# Patient Record
Sex: MALE | HISPANIC OR LATINO | Employment: FULL TIME | URBAN - METROPOLITAN AREA
[De-identification: names, ages, dates, MRNs, and addresses within clinical notes are randomized per-mention and may not be internally consistent; named-entity substitution may affect disease eponyms.]

---

## 2019-03-13 ENCOUNTER — HOSPITAL ENCOUNTER (OUTPATIENT)
Dept: LAB | Facility: MEDICAL CENTER | Age: 24
End: 2019-03-13
Attending: PSYCHIATRY & NEUROLOGY
Payer: COMMERCIAL

## 2019-03-13 ENCOUNTER — OFFICE VISIT (OUTPATIENT)
Dept: NEUROLOGY | Facility: MEDICAL CENTER | Age: 24
End: 2019-03-13
Payer: COMMERCIAL

## 2019-03-13 VITALS
DIASTOLIC BLOOD PRESSURE: 60 MMHG | RESPIRATION RATE: 16 BRPM | TEMPERATURE: 97.1 F | SYSTOLIC BLOOD PRESSURE: 100 MMHG | OXYGEN SATURATION: 94 % | BODY MASS INDEX: 28.09 KG/M2 | WEIGHT: 179 LBS | HEART RATE: 56 BPM | HEIGHT: 67 IN

## 2019-03-13 DIAGNOSIS — F07.81 POST CONCUSSION SYNDROME: ICD-10-CM

## 2019-03-13 DIAGNOSIS — R41.3 MEMORY LOSS: ICD-10-CM

## 2019-03-13 DIAGNOSIS — F98.8 ATTENTION DEFICIT DISORDER, UNSPECIFIED HYPERACTIVITY PRESENCE: ICD-10-CM

## 2019-03-13 DIAGNOSIS — E53.8 B12 DEFICIENCY: ICD-10-CM

## 2019-03-13 LAB
ALBUMIN SERPL BCP-MCNC: 5.3 G/DL (ref 3.2–4.9)
ALBUMIN/GLOB SERPL: 1.7 G/DL
ALP SERPL-CCNC: 65 U/L (ref 30–99)
ALT SERPL-CCNC: 15 U/L (ref 2–50)
ANION GAP SERPL CALC-SCNC: 10 MMOL/L (ref 0–11.9)
AST SERPL-CCNC: 19 U/L (ref 12–45)
BASOPHILS # BLD AUTO: 0.9 % (ref 0–1.8)
BASOPHILS # BLD: 0.05 K/UL (ref 0–0.12)
BILIRUB SERPL-MCNC: 1.2 MG/DL (ref 0.1–1.5)
BUN SERPL-MCNC: 14 MG/DL (ref 8–22)
CALCIUM SERPL-MCNC: 10.5 MG/DL (ref 8.5–10.5)
CHLORIDE SERPL-SCNC: 101 MMOL/L (ref 96–112)
CO2 SERPL-SCNC: 31 MMOL/L (ref 20–33)
CREAT SERPL-MCNC: 1.2 MG/DL (ref 0.5–1.4)
EOSINOPHIL # BLD AUTO: 0.04 K/UL (ref 0–0.51)
EOSINOPHIL NFR BLD: 0.7 % (ref 0–6.9)
ERYTHROCYTE [DISTWIDTH] IN BLOOD BY AUTOMATED COUNT: 39.1 FL (ref 35.9–50)
GLOBULIN SER CALC-MCNC: 3.1 G/DL (ref 1.9–3.5)
GLUCOSE SERPL-MCNC: 86 MG/DL (ref 65–99)
HCT VFR BLD AUTO: 50.8 % (ref 42–52)
HGB BLD-MCNC: 16 G/DL (ref 14–18)
IMM GRANULOCYTES # BLD AUTO: 0.01 K/UL (ref 0–0.11)
IMM GRANULOCYTES NFR BLD AUTO: 0.2 % (ref 0–0.9)
LYMPHOCYTES # BLD AUTO: 1.49 K/UL (ref 1–4.8)
LYMPHOCYTES NFR BLD: 27.8 % (ref 22–41)
MCH RBC QN AUTO: 26.5 PG (ref 27–33)
MCHC RBC AUTO-ENTMCNC: 31.5 G/DL (ref 33.7–35.3)
MCV RBC AUTO: 84.1 FL (ref 81.4–97.8)
MONOCYTES # BLD AUTO: 0.68 K/UL (ref 0–0.85)
MONOCYTES NFR BLD AUTO: 12.7 % (ref 0–13.4)
NEUTROPHILS # BLD AUTO: 3.09 K/UL (ref 1.82–7.42)
NEUTROPHILS NFR BLD: 57.7 % (ref 44–72)
NRBC # BLD AUTO: 0 K/UL
NRBC BLD-RTO: 0 /100 WBC
PLATELET # BLD AUTO: 274 K/UL (ref 164–446)
PMV BLD AUTO: 9.9 FL (ref 9–12.9)
POTASSIUM SERPL-SCNC: 3.8 MMOL/L (ref 3.6–5.5)
PROT SERPL-MCNC: 8.4 G/DL (ref 6–8.2)
RBC # BLD AUTO: 6.04 M/UL (ref 4.7–6.1)
SODIUM SERPL-SCNC: 142 MMOL/L (ref 135–145)
TSH SERPL DL<=0.005 MIU/L-ACNC: 1.34 UIU/ML (ref 0.38–5.33)
VIT B12 SERPL-MCNC: 260 PG/ML (ref 211–911)
WBC # BLD AUTO: 5.4 K/UL (ref 4.8–10.8)

## 2019-03-13 PROCEDURE — 99204 OFFICE O/P NEW MOD 45 MIN: CPT | Performed by: PSYCHIATRY & NEUROLOGY

## 2019-03-13 PROCEDURE — 82607 VITAMIN B-12: CPT

## 2019-03-13 PROCEDURE — 85025 COMPLETE CBC W/AUTO DIFF WBC: CPT

## 2019-03-13 PROCEDURE — 84443 ASSAY THYROID STIM HORMONE: CPT

## 2019-03-13 PROCEDURE — 36415 COLL VENOUS BLD VENIPUNCTURE: CPT

## 2019-03-13 PROCEDURE — 80053 COMPREHEN METABOLIC PANEL: CPT

## 2019-03-13 RX ORDER — METOCLOPRAMIDE 10 MG/1
TABLET ORAL
Refills: 0 | COMMUNITY
Start: 2019-01-14 | End: 2019-03-13

## 2019-03-13 RX ORDER — IBUPROFEN 400 MG/1
TABLET ORAL
Refills: 0 | COMMUNITY
Start: 2019-01-13 | End: 2019-03-13

## 2019-03-13 RX ORDER — AMOXICILLIN AND CLAVULANATE POTASSIUM 875; 125 MG/1; MG/1
TABLET, FILM COATED ORAL
Refills: 0 | COMMUNITY
Start: 2019-01-08 | End: 2019-03-13

## 2019-03-13 RX ORDER — ONDANSETRON 4 MG/1
TABLET, ORALLY DISINTEGRATING ORAL
Refills: 0 | COMMUNITY
Start: 2019-01-13 | End: 2019-03-13

## 2019-03-13 RX ORDER — LISDEXAMFETAMINE DIMESYLATE 30 MG/1
CAPSULE ORAL
COMMUNITY
Start: 2019-03-07 | End: 2020-03-04 | Stop reason: SDUPTHER

## 2019-03-13 RX ORDER — NAPROXEN 500 MG/1
500 TABLET ORAL
COMMUNITY
Start: 2018-07-24 | End: 2019-03-13

## 2019-03-13 RX ORDER — DIPHENHYDRAMINE HCL 25 MG
25 TABLET ORAL EVERY 6 HOURS PRN
COMMUNITY

## 2019-03-13 ASSESSMENT — ENCOUNTER SYMPTOMS
ABDOMINAL PAIN: 0
FALLS: 0
HALLUCINATIONS: 0
BRUISES/BLEEDS EASILY: 0
WEIGHT LOSS: 0
EYE DISCHARGE: 0
SORE THROAT: 0
SHORTNESS OF BREATH: 0
FEVER: 0

## 2019-03-13 ASSESSMENT — PATIENT HEALTH QUESTIONNAIRE - PHQ9: CLINICAL INTERPRETATION OF PHQ2 SCORE: 0
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
## 2019-03-13 NOTE — PROGRESS NOTES
Borderline low B12 in the setting of memory complaints.  Will continue with further workup to evaluate for possible B12 or other deficiencies with a folate, MMA, homocystine.  We will have Angy alert patient.

## 2019-03-14 ENCOUNTER — TELEPHONE (OUTPATIENT)
Dept: NEUROLOGY | Facility: MEDICAL CENTER | Age: 24
End: 2019-03-14

## 2019-03-14 NOTE — TELEPHONE ENCOUNTER
----- Message from Rose Vaughan M.D. sent at 3/13/2019  2:54 PM PDT -----  Regarding: Additional lab  He Angy I reviewed his labs and his B12 level is borderline low.  I have ordered additional labs to further evaluate in case this is the cause of his memory problems.  Please alert the patient.  Thank you

## 2019-03-14 NOTE — TELEPHONE ENCOUNTER
Spoke to patient - relayed message and he will complete additional blood work.    Pt also stated he plans on donating a kidney in the future. He would like to know his blood type. Advised pt this does not come from our office. Pt wanted me to ask Dr. Vaughan because he is very curious on his blood type...

## 2019-03-15 ENCOUNTER — HOSPITAL ENCOUNTER (OUTPATIENT)
Dept: LAB | Facility: MEDICAL CENTER | Age: 24
End: 2019-03-15
Attending: PSYCHIATRY & NEUROLOGY
Payer: COMMERCIAL

## 2019-03-15 DIAGNOSIS — E53.8 B12 DEFICIENCY: ICD-10-CM

## 2019-03-15 LAB
FOLATE SERPL-MCNC: 14.5 NG/ML
HCYS SERPL-SCNC: 7.49 UMOL/L

## 2019-03-15 PROCEDURE — 82746 ASSAY OF FOLIC ACID SERUM: CPT

## 2019-03-15 PROCEDURE — 36415 COLL VENOUS BLD VENIPUNCTURE: CPT

## 2019-03-15 PROCEDURE — 83921 ORGANIC ACID SINGLE QUANT: CPT

## 2019-03-15 PROCEDURE — 83090 ASSAY OF HOMOCYSTEINE: CPT

## 2019-03-18 LAB — METHYLMALONATE SERPL-SCNC: 0.12 UMOL/L (ref 0–0.4)

## 2019-03-25 ENCOUNTER — HOSPITAL ENCOUNTER (OUTPATIENT)
Dept: RADIOLOGY | Facility: MEDICAL CENTER | Age: 24
End: 2019-03-25
Attending: PSYCHIATRY & NEUROLOGY
Payer: COMMERCIAL

## 2019-03-25 DIAGNOSIS — F07.81 POST CONCUSSION SYNDROME: ICD-10-CM

## 2019-03-25 DIAGNOSIS — R41.3 MEMORY LOSS: ICD-10-CM

## 2019-03-25 PROCEDURE — 70551 MRI BRAIN STEM W/O DYE: CPT

## 2019-03-29 ENCOUNTER — TELEPHONE (OUTPATIENT)
Dept: NEUROLOGY | Facility: MEDICAL CENTER | Age: 24
End: 2019-03-29

## 2019-03-29 NOTE — TELEPHONE ENCOUNTER
Rose Vaughan M.D.  Angy Larson, Med Ass't   Caller: Unspecified (Today, 11:34 AM)             Please let him know that the follow-up testing for B12 deficiency was unremarkable.  He does not have B12 deficiency.  He is welcome to take a multivitamin.     MRI brain was otherwise unremarkable.  Continue follow-up as planned. Thx      Called pt and informed.

## 2019-06-13 ENCOUNTER — OFFICE VISIT (OUTPATIENT)
Dept: NEUROLOGY | Facility: MEDICAL CENTER | Age: 24
End: 2019-06-13
Payer: COMMERCIAL

## 2019-06-13 VITALS
SYSTOLIC BLOOD PRESSURE: 104 MMHG | WEIGHT: 170 LBS | TEMPERATURE: 97 F | DIASTOLIC BLOOD PRESSURE: 70 MMHG | BODY MASS INDEX: 26.68 KG/M2 | OXYGEN SATURATION: 95 % | HEIGHT: 67 IN | HEART RATE: 74 BPM | RESPIRATION RATE: 16 BRPM

## 2019-06-13 DIAGNOSIS — F98.8 ATTENTION DEFICIT DISORDER, UNSPECIFIED HYPERACTIVITY PRESENCE: ICD-10-CM

## 2019-06-13 DIAGNOSIS — F07.81 POST CONCUSSION SYNDROME: ICD-10-CM

## 2019-06-13 DIAGNOSIS — R41.3 MEMORY LOSS: ICD-10-CM

## 2019-06-13 PROCEDURE — 99214 OFFICE O/P EST MOD 30 MIN: CPT | Performed by: PSYCHIATRY & NEUROLOGY

## 2019-06-13 ASSESSMENT — ENCOUNTER SYMPTOMS
SHORTNESS OF BREATH: 0
WEIGHT LOSS: 0
SORE THROAT: 0
DEPRESSION: 0
FEVER: 0
FALLS: 0
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
## 2020-03-04 ENCOUNTER — OFFICE VISIT (OUTPATIENT)
Dept: MEDICAL GROUP | Facility: MEDICAL CENTER | Age: 25
End: 2020-03-04
Payer: COMMERCIAL

## 2020-03-04 VITALS
OXYGEN SATURATION: 99 % | HEART RATE: 68 BPM | DIASTOLIC BLOOD PRESSURE: 68 MMHG | TEMPERATURE: 97.5 F | HEIGHT: 67 IN | SYSTOLIC BLOOD PRESSURE: 108 MMHG | BODY MASS INDEX: 25.24 KG/M2 | WEIGHT: 160.8 LBS

## 2020-03-04 DIAGNOSIS — L70.0 ACNE VULGARIS: ICD-10-CM

## 2020-03-04 DIAGNOSIS — F98.8 ATTENTION DEFICIT DISORDER (ADD) WITHOUT HYPERACTIVITY: ICD-10-CM

## 2020-03-04 DIAGNOSIS — Z79.899 ON STIMULANT MEDICATION: ICD-10-CM

## 2020-03-04 DIAGNOSIS — J45.20 MILD INTERMITTENT ASTHMA WITHOUT COMPLICATION: ICD-10-CM

## 2020-03-04 PROBLEM — D16.12 ENCHONDROMA OF FINGER OF LEFT HAND: Status: ACTIVE | Noted: 2018-07-25

## 2020-03-04 PROBLEM — Q87.89: Status: ACTIVE | Noted: 2020-03-04

## 2020-03-04 PROCEDURE — 99204 OFFICE O/P NEW MOD 45 MIN: CPT | Performed by: NURSE PRACTITIONER

## 2020-03-04 RX ORDER — ALBUTEROL SULFATE 90 UG/1
2 AEROSOL, METERED RESPIRATORY (INHALATION) EVERY 4 HOURS PRN
Qty: 1 INHALER | Refills: 3 | Status: SHIPPED | OUTPATIENT
Start: 2020-03-04 | End: 2021-11-17 | Stop reason: SDUPTHER

## 2020-03-04 RX ORDER — LISDEXAMFETAMINE DIMESYLATE 30 MG/1
30 CAPSULE ORAL EVERY MORNING
Qty: 30 CAP | Refills: 0 | Status: SHIPPED | OUTPATIENT
Start: 2020-03-04 | End: 2020-04-03 | Stop reason: SDUPTHER

## 2020-03-04 RX ORDER — TRETINOIN 1 MG/G
CREAM TOPICAL NIGHTLY
COMMUNITY
End: 2020-03-04 | Stop reason: SDUPTHER

## 2020-03-04 RX ORDER — TRETINOIN 1 MG/G
1 CREAM TOPICAL EVERY EVENING
Qty: 45 G | Refills: 1 | Status: SHIPPED | OUTPATIENT
Start: 2020-03-04 | End: 2020-09-02

## 2020-03-04 SDOH — HEALTH STABILITY: MENTAL HEALTH: HOW MANY STANDARD DRINKS CONTAINING ALCOHOL DO YOU HAVE ON A TYPICAL DAY?: 3 OR 4

## 2020-03-04 SDOH — HEALTH STABILITY: MENTAL HEALTH: HOW OFTEN DO YOU HAVE A DRINK CONTAINING ALCOHOL?: 2-4 TIMES A MONTH

## 2020-03-04 SDOH — HEALTH STABILITY: MENTAL HEALTH: HOW OFTEN DO YOU HAVE 6 OR MORE DRINKS ON ONE OCCASION?: LESS THAN MONTHLY

## 2020-03-04 ASSESSMENT — PATIENT HEALTH QUESTIONNAIRE - PHQ9
CLINICAL INTERPRETATION OF PHQ2 SCORE: 1
5. POOR APPETITE OR OVEREATING: 0 - NOT AT ALL
SUM OF ALL RESPONSES TO PHQ QUESTIONS 1-9: 1

## 2020-03-04 ASSESSMENT — FIBROSIS 4 INDEX: FIB4 SCORE: 0.41
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
## 2020-03-04 NOTE — LETTER
[facility]  Pcp Pt States None  No address on file  Fax: [phone]   Authorization for Release/Disclosure of   Protected Health Information   Name: KULWANT MCKEON : 1995 SSN: [id number]   Address: [facility] [address]  [place] [address] Phone:    [phone] (home)    I authorize the entity listed below to release/disclose the PHI below to:   [facility]/Pcp Pt States None and LOTTIE [place]   Provider or Entity Name:     Address   City, State, Zip   Phone:      Fax:     Reason for request: continuity of care   Information to be released:    [  ] LAST COLONOSCOPY,  including any PATH REPORT and follow-up  [  ] LAST FIT/COLOGUARD RESULT [  ] LAST DEXA  [  ] LAST MAMMOGRAM  [  ] LAST PAP  [  ] LAST LABS [  ] RETINA EXAM REPORT  [  ] IMMUNIZATION RECORDS  [  ] Release all info      [  ] Check here and initial the line next to each item to release ALL health information INCLUDING  _____ Care and treatment for drug and / or alcohol abuse  _____ HIV testing, infection status, or AIDS  _____ Genetic Testing    DATES OF SERVICE OR TIME PERIOD TO BE DISCLOSED: _____________  I understand and acknowledge that:  * This Authorization may be revoked at any time by you in writing, except if your health information has already been used or disclosed.  * Your health information that will be used or disclosed as a result of you signing this authorization could be re-disclosed by the recipient. If this occurs, your re-disclosed health information may no longer be protected by State or Federal laws.  * You may refuse to sign this Authorization. Your refusal will not affect your ability to obtain treatment.  * This Authorization becomes effective upon signing and will  on (date) __________.      If no date is indicated, this Authorization will  one (1) year from the signature date.    Name: Kulwant Mckeon    Signature:   Date:     3/4/2020       PLEASE FAX REQUESTED RECORDS BACK  TO: [phone]
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
## 2020-03-04 NOTE — ASSESSMENT & PLAN NOTE
Chronic issue.  New to me today.  Currently using salicylic acid face wash twice daily and tretinoin 0.1% cream around 3 times per week.  The tretinoin cream was started a few months ago and has helped, but not as much as he would like.  He has tried to increase use more than 3 times per week but has had significant dryness.    He would like to discuss other treatment options available.

## 2020-03-10 DIAGNOSIS — Z79.899 ON STIMULANT MEDICATION: ICD-10-CM

## 2020-03-10 DIAGNOSIS — F98.8 ATTENTION DEFICIT DISORDER (ADD) WITHOUT HYPERACTIVITY: ICD-10-CM

## 2020-04-03 ENCOUNTER — OFFICE VISIT (OUTPATIENT)
Dept: MEDICAL GROUP | Facility: MEDICAL CENTER | Age: 25
End: 2020-04-03
Payer: COMMERCIAL

## 2020-04-03 VITALS
SYSTOLIC BLOOD PRESSURE: 104 MMHG | HEIGHT: 67 IN | OXYGEN SATURATION: 100 % | BODY MASS INDEX: 25.43 KG/M2 | WEIGHT: 162 LBS | TEMPERATURE: 97.2 F | HEART RATE: 53 BPM | DIASTOLIC BLOOD PRESSURE: 72 MMHG

## 2020-04-03 DIAGNOSIS — Z79.899 ON STIMULANT MEDICATION: ICD-10-CM

## 2020-04-03 DIAGNOSIS — J45.20 MILD INTERMITTENT ASTHMA WITHOUT COMPLICATION: ICD-10-CM

## 2020-04-03 DIAGNOSIS — Z23 NEED FOR VACCINATION: ICD-10-CM

## 2020-04-03 DIAGNOSIS — F98.8 ATTENTION DEFICIT DISORDER (ADD) WITHOUT HYPERACTIVITY: ICD-10-CM

## 2020-04-03 PROCEDURE — 99214 OFFICE O/P EST MOD 30 MIN: CPT | Mod: 25 | Performed by: NURSE PRACTITIONER

## 2020-04-03 PROCEDURE — 90651 9VHPV VACCINE 2/3 DOSE IM: CPT | Performed by: NURSE PRACTITIONER

## 2020-04-03 PROCEDURE — 90471 IMMUNIZATION ADMIN: CPT | Performed by: NURSE PRACTITIONER

## 2020-04-03 PROCEDURE — 90621 MENB-FHBP VACC 2/3 DOSE IM: CPT | Performed by: NURSE PRACTITIONER

## 2020-04-03 PROCEDURE — 90472 IMMUNIZATION ADMIN EACH ADD: CPT | Performed by: NURSE PRACTITIONER

## 2020-04-03 RX ORDER — INHALER, ASSIST DEVICES
1 SPACER (EA) MISCELLANEOUS ONCE
Qty: 1 EACH | Refills: 1 | Status: SHIPPED | OUTPATIENT
Start: 2020-04-03 | End: 2020-04-03

## 2020-04-03 RX ORDER — LISDEXAMFETAMINE DIMESYLATE 30 MG/1
30 CAPSULE ORAL EVERY MORNING
Qty: 30 CAP | Refills: 0 | Status: SHIPPED | OUTPATIENT
Start: 2020-04-06 | End: 2020-04-03 | Stop reason: SDUPTHER

## 2020-04-03 RX ORDER — LISDEXAMFETAMINE DIMESYLATE 30 MG/1
30 CAPSULE ORAL EVERY MORNING
Qty: 30 CAP | Refills: 0 | Status: SHIPPED | OUTPATIENT
Start: 2020-06-07 | End: 2020-07-07

## 2020-04-03 RX ORDER — LISDEXAMFETAMINE DIMESYLATE 30 MG/1
30 CAPSULE ORAL EVERY MORNING
Qty: 30 CAP | Refills: 0 | Status: SHIPPED | OUTPATIENT
Start: 2020-05-07 | End: 2020-04-03 | Stop reason: SDUPTHER

## 2020-04-03 ASSESSMENT — FIBROSIS 4 INDEX: FIB4 SCORE: 0.41

## 2020-04-04 NOTE — ASSESSMENT & PLAN NOTE
Chronic. New to me today. Diagnosed end of high school. Was established with psychiatry in [place], taking Vyvanse 30 mg once daily, takes medication vacations regularly. No weight loss, heart palpitation, chest pain. Does report insomnia but this was an issue prior to starting Vyvanse.      His last dose of Vyvanse was yesterday.  He does report to using marijuana occasionally, around once per week.  No other illicit drugs.    Does take medication vacations regularly. Does notice about a 20 point increase in his blood pressure when on the medication, generally runs 100s systolic off medication, increases to 120-130s on medication, no higher.

## 2020-04-08 NOTE — PROGRESS NOTES
"Subjective:   Kulwant Schultz is a 24 y.o. male here today for ADD follow up and medication refill:    ADD (attention deficit disorder)  Chronic. New to me today. Diagnosed end of high school. Was established with psychiatry in [place], taking Vyvanse 30 mg once daily, takes medication vacations regularly. No weight loss, heart palpitation, chest pain. Does report insomnia but this was an issue prior to starting Vyvanse.      His last dose of Vyvanse was yesterday.  He does report to using marijuana occasionally, around once per week.  No other illicit drugs.    Does take medication vacations regularly. Does notice about a 20 point increase in his blood pressure when on the medication, generally runs 100s systolic off medication, increases to 120-130s on medication, no higher.        Current medicines (including changes today)  Current Outpatient Medications   Medication Sig Dispense Refill   • [START ON 6/7/2020] VYVANSE 30 MG capsule Take 1 Cap by mouth every morning for 30 days. 30 Cap 0   • albuterol 108 (90 Base) MCG/ACT Aero Soln inhalation aerosol Inhale 2 Puffs by mouth every four hours as needed for Shortness of Breath. 1 Inhaler 3   • tretinoin (RETIN-A) 0.1 % cream Apply 1 Application to affected area(s) every evening. 45 g 1   • diphenhydrAMINE (BENADRYL) 25 MG Tab Take 25 mg by mouth every 6 hours as needed for Sleep.       No current facility-administered medications for this visit.      He  has a past medical history of ADD (attention deficit disorder), Asthma, and Concussion.    ROS  No chest pain, no shortness of breath, no abdominal pain  Positive ROS as per HPI.  All other systems reviewed and are negative.     Objective:     /72 (BP Location: Right arm, Patient Position: Sitting, BP Cuff Size: Adult long)   Pulse (!) 53   Temp 36.2 °C (97.2 °F) (Temporal)   Ht 1.702 m (5' 7\")   Wt 73.5 kg (162 lb)   SpO2 100%  Body mass index is 25.37 kg/m².     Physical " Exam:  Constitutional: Alert, no distress.  Skin: Warm, dry, good turgor, no rashes in visible areas.  Eye: Equal, round, conjunctiva clear, lids normal.  ENMT: Lips without lesions, good dentition  Neck: Trachea midline  Respiratory: Unlabored respiratory effort  Cardiovascular: Normal S1, S2, no murmur, no edema.  Psych: Alert and oriented x3, normal affect and mood.      Assessment and Plan:   The following treatment plan was discussed    1. Attention deficit disorder (ADD) without hyperactivity  Stable  UDS consistent 3/4/2020   reviewed and consistent  Refills given for 3 months  Contract up to date    - VYVANSE 30 MG capsule; Take 1 Cap by mouth every morning for 30 days.  Dispense: 30 Cap; Refill: 0    2. On stimulant medication  - VYVANSE 30 MG capsule; Take 1 Cap by mouth every morning for 30 days.  Dispense: 30 Cap; Refill: 0    3. Mild intermittent asthma without complication  Stable  Requesting refill for spacer  - Spacer/Aero-Holding Chambers (AEROCHAMBER MV) Misc; 1 Each by Does not apply route Once for 1 dose.  Dispense: 1 Each; Refill: 1    4. Need for vaccination  Patient to ask mother if he has received Pneumovax 23  - Gardasil 9  - Meningococcal (IM) Group B      Followup: Return in about 3 months (around 7/3/2020) for Medication Refill.    I have placed the below orders and discussed them with an approved delegating provider. The MA is performing the below orders under the direction of Dr. Chi

## 2020-06-10 ENCOUNTER — APPOINTMENT (RX ONLY)
Dept: URBAN - METROPOLITAN AREA CLINIC 20 | Facility: CLINIC | Age: 25
Setting detail: DERMATOLOGY
End: 2020-06-10

## 2020-06-10 DIAGNOSIS — L90.5 SCAR CONDITIONS AND FIBROSIS OF SKIN: ICD-10-CM

## 2020-06-10 DIAGNOSIS — L70.0 ACNE VULGARIS: ICD-10-CM | Status: INADEQUATELY CONTROLLED

## 2020-06-10 PROCEDURE — ? COUNSELING

## 2020-06-10 PROCEDURE — 99202 OFFICE O/P NEW SF 15 MIN: CPT

## 2020-06-10 PROCEDURE — ? ADDITIONAL NOTES

## 2020-06-10 PROCEDURE — ? PRESCRIPTION

## 2020-06-10 RX ORDER — ADAPALENE AND BENZOYL PEROXIDE 3; 25 MG/G; MG/G
GEL TOPICAL QD
Qty: 1 | Refills: 6 | Status: ERX | COMMUNITY
Start: 2020-06-10

## 2020-06-10 RX ADMIN — ADAPALENE AND BENZOYL PEROXIDE: 3; 25 GEL TOPICAL at 00:00

## 2020-06-10 ASSESSMENT — LOCATION DETAILED DESCRIPTION DERM
LOCATION DETAILED: LEFT CENTRAL MALAR CHEEK
LOCATION DETAILED: RIGHT SUPERIOR LATERAL MIDBACK
LOCATION DETAILED: LEFT CHIN
LOCATION DETAILED: SUPERIOR MID FOREHEAD
LOCATION DETAILED: LEFT INFERIOR CENTRAL MALAR CHEEK
LOCATION DETAILED: RIGHT LOWER CUTANEOUS LIP
LOCATION DETAILED: RIGHT CENTRAL MALAR CHEEK
LOCATION DETAILED: RIGHT INFERIOR CENTRAL MALAR CHEEK
LOCATION DETAILED: LEFT INFERIOR UPPER BACK
LOCATION DETAILED: LEFT SUPERIOR UPPER BACK

## 2020-06-10 ASSESSMENT — LOCATION SIMPLE DESCRIPTION DERM
LOCATION SIMPLE: LEFT UPPER BACK
LOCATION SIMPLE: LEFT CHEEK
LOCATION SIMPLE: RIGHT LIP
LOCATION SIMPLE: CHIN
LOCATION SIMPLE: RIGHT LOWER BACK
LOCATION SIMPLE: SUPERIOR FOREHEAD
LOCATION SIMPLE: RIGHT CHEEK

## 2020-06-10 ASSESSMENT — LOCATION ZONE DERM
LOCATION ZONE: FACE
LOCATION ZONE: LIP
LOCATION ZONE: TRUNK

## 2020-06-10 NOTE — PROCEDURE: ADDITIONAL NOTES
Detail Level: Simple
Additional Notes: Plan\\n- benzoyl peroxide cleanser on back\\n-salicylic acid cleanser on face\\n-Epiduo on the face at bedtime \\n-Amzeeq foam on the back

## 2020-06-10 NOTE — PROCEDURE: COUNSELING
Birth Control Pills Counseling: Birth Control Pill Counseling: I discussed with the patient the potential side effects of OCPs including but not limited to increased risk of stroke, heart attack, thrombophlebitis, deep venous thrombosis, hepatic adenomas, breast changes, GI upset, headaches, and depression.  The patient verbalized understanding of the proper use and possible adverse effects of OCPs. All of the patient's questions and concerns were addressed.
Include Pregnancy/Lactation Warning?: No
Topical Clindamycin Pregnancy And Lactation Text: This medication is Pregnancy Category B and is considered safe during pregnancy. It is unknown if it is excreted in breast milk.
High Dose Vitamin A Pregnancy And Lactation Text: High dose vitamin A therapy is contraindicated during pregnancy and breast feeding.
Sarecycline Pregnancy And Lactation Text: This medication is Pregnancy Category D and not consider safe during pregnancy. It is also excreted in breast milk.
Azithromycin Counseling:  I discussed with the patient the risks of azithromycin including but not limited to GI upset, allergic reaction, drug rash, diarrhea, and yeast infections.
Dapsone Pregnancy And Lactation Text: This medication is Pregnancy Category C and is not considered safe during pregnancy or breast feeding.
Erythromycin Pregnancy And Lactation Text: This medication is Pregnancy Category B and is considered safe during pregnancy. It is also excreted in breast milk.
Topical Retinoid Pregnancy And Lactation Text: This medication is Pregnancy Category C. It is unknown if this medication is excreted in breast milk.
Bactrim Pregnancy And Lactation Text: This medication is Pregnancy Category D and is known to cause fetal risk.  It is also excreted in breast milk.
Sarecycline Counseling: Patient advised regarding possible photosensitivity and discoloration of the teeth, skin, lips, tongue and gums.  Patient instructed to avoid sunlight, if possible.  When exposed to sunlight, patients should wear protective clothing, sunglasses, and sunscreen.  The patient was instructed to call the office immediately if the following severe adverse effects occur:  hearing changes, easy bruising/bleeding, severe headache, or vision changes.  The patient verbalized understanding of the proper use and possible adverse effects of sarecycline.  All of the patient's questions and concerns were addressed.
Erythromycin Counseling:  I discussed with the patient the risks of erythromycin including but not limited to GI upset, allergic reaction, drug rash, diarrhea, increase in liver enzymes, and yeast infections.
Topical Clindamycin Counseling: Patient counseled that this medication may cause skin irritation or allergic reactions.  In the event of skin irritation, the patient was advised to reduce the amount of the drug applied or use it less frequently.   The patient verbalized understanding of the proper use and possible adverse effects of clindamycin.  All of the patient's questions and concerns were addressed.
High Dose Vitamin A Counseling: Side effects reviewed, pt to contact office should one occur.
Tetracycline Counseling: Patient counseled regarding possible photosensitivity and increased risk for sunburn.  Patient instructed to avoid sunlight, if possible.  When exposed to sunlight, patients should wear protective clothing, sunglasses, and sunscreen.  The patient was instructed to call the office immediately if the following severe adverse effects occur:  hearing changes, easy bruising/bleeding, severe headache, or vision changes.  The patient verbalized understanding of the proper use and possible adverse effects of tetracycline.  All of the patient's questions and concerns were addressed. Patient understands to avoid pregnancy while on therapy due to potential birth defects.
Dapsone Counseling: I discussed with the patient the risks of dapsone including but not limited to hemolytic anemia, agranulocytosis, rashes, methemoglobinemia, kidney failure, peripheral neuropathy, headaches, GI upset, and liver toxicity.  Patients who start dapsone require monitoring including baseline LFTs and weekly CBCs for the first month, then every month thereafter.  The patient verbalized understanding of the proper use and possible adverse effects of dapsone.  All of the patient's questions and concerns were addressed.
Bactrim Counseling:  I discussed with the patient the risks of sulfa antibiotics including but not limited to GI upset, allergic reaction, drug rash, diarrhea, dizziness, photosensitivity, and yeast infections.  Rarely, more serious reactions can occur including but not limited to aplastic anemia, agranulocytosis, methemoglobinemia, blood dyscrasias, liver or kidney failure, lung infiltrates or desquamative/blistering drug rashes.
Topical Retinoid counseling:  Patient advised to apply a pea-sized amount only at bedtime and wait 30 minutes after washing their face before applying.  If too drying, patient may add a non-comedogenic moisturizer. The patient verbalized understanding of the proper use and possible adverse effects of retinoids.  All of the patient's questions and concerns were addressed.
Isotretinoin Pregnancy And Lactation Text: This medication is Pregnancy Category X and is considered extremely dangerous during pregnancy. It is unknown if it is excreted in breast milk.
Topical Sulfur Applications Pregnancy And Lactation Text: This medication is Pregnancy Category C and has an unknown safety profile during pregnancy. It is unknown if this topical medication is excreted in breast milk.
Spironolactone Pregnancy And Lactation Text: This medication can cause feminization of the male fetus and should be avoided during pregnancy. The active metabolite is also found in breast milk.
Benzoyl Peroxide Pregnancy And Lactation Text: This medication is Pregnancy Category C. It is unknown if benzoyl peroxide is excreted in breast milk.
Doxycycline Pregnancy And Lactation Text: This medication is Pregnancy Category D and not consider safe during pregnancy. It is also excreted in breast milk but is considered safe for shorter treatment courses.
Birth Control Pills Pregnancy And Lactation Text: This medication should be avoided if pregnant and for the first 30 days post-partum.
Tazorac Pregnancy And Lactation Text: This medication is not safe during pregnancy. It is unknown if this medication is excreted in breast milk.
Spironolactone Counseling: Patient advised regarding risks of diarrhea, abdominal pain, hyperkalemia, birth defects (for female patients), liver toxicity and renal toxicity. The patient may need blood work to monitor liver and kidney function and potassium levels while on therapy. The patient verbalized understanding of the proper use and possible adverse effects of spironolactone.  All of the patient's questions and concerns were addressed.
Azithromycin Pregnancy And Lactation Text: This medication is considered safe during pregnancy and is also secreted in breast milk.
Tazorac Counseling:  Patient advised that medication is irritating and drying.  Patient may need to apply sparingly and wash off after an hour before eventually leaving it on overnight.  The patient verbalized understanding of the proper use and possible adverse effects of tazorac.  All of the patient's questions and concerns were addressed.
Detail Level: Zone
Doxycycline Counseling:  Patient counseled regarding possible photosensitivity and increased risk for sunburn.  Patient instructed to avoid sunlight, if possible.  When exposed to sunlight, patients should wear protective clothing, sunglasses, and sunscreen.  The patient was instructed to call the office immediately if the following severe adverse effects occur:  hearing changes, easy bruising/bleeding, severe headache, or vision changes.  The patient verbalized understanding of the proper use and possible adverse effects of doxycycline.  All of the patient's questions and concerns were addressed.
Benzoyl Peroxide Counseling: Patient counseled that medicine may cause skin irritation and bleach clothing.  In the event of skin irritation, the patient was advised to reduce the amount of the drug applied or use it less frequently.   The patient verbalized understanding of the proper use and possible adverse effects of benzoyl peroxide.  All of the patient's questions and concerns were addressed.
Minocycline Counseling: Patient advised regarding possible photosensitivity and discoloration of the teeth, skin, lips, tongue and gums.  Patient instructed to avoid sunlight, if possible.  When exposed to sunlight, patients should wear protective clothing, sunglasses, and sunscreen.  The patient was instructed to call the office immediately if the following severe adverse effects occur:  hearing changes, easy bruising/bleeding, severe headache, or vision changes.  The patient verbalized understanding of the proper use and possible adverse effects of minocycline.  All of the patient's questions and concerns were addressed.
Topical Sulfur Applications Counseling: Topical Sulfur Counseling: Patient counseled that this medication may cause skin irritation or allergic reactions.  In the event of skin irritation, the patient was advised to reduce the amount of the drug applied or use it less frequently.   The patient verbalized understanding of the proper use and possible adverse effects of topical sulfur application.  All of the patient's questions and concerns were addressed.
Isotretinoin Counseling: Patient should get monthly blood tests, not donate blood, not drive at night if vision affected, not share medication, and not undergo elective surgery for 6 months after tx completed. Side effects reviewed, pt to contact office should one occur.

## 2020-08-26 ENCOUNTER — PATIENT MESSAGE (OUTPATIENT)
Dept: MEDICAL GROUP | Facility: MEDICAL CENTER | Age: 25
End: 2020-08-26

## 2020-08-26 RX ORDER — LISDEXAMFETAMINE DIMESYLATE CAPSULES 30 MG/1
30 CAPSULE ORAL EVERY MORNING
Qty: 30 CAP
Start: 2020-08-26

## 2020-08-26 RX ORDER — LISDEXAMFETAMINE DIMESYLATE CAPSULES 30 MG/1
30 CAPSULE ORAL EVERY MORNING
COMMUNITY
End: 2020-09-02 | Stop reason: SDUPTHER

## 2020-09-02 ENCOUNTER — TELEMEDICINE (OUTPATIENT)
Dept: MEDICAL GROUP | Facility: MEDICAL CENTER | Age: 25
End: 2020-09-02
Payer: COMMERCIAL

## 2020-09-02 VITALS — HEIGHT: 67 IN | BODY MASS INDEX: 24.64 KG/M2 | WEIGHT: 157 LBS

## 2020-09-02 DIAGNOSIS — F98.8 ATTENTION DEFICIT DISORDER (ADD) WITHOUT HYPERACTIVITY: ICD-10-CM

## 2020-09-02 PROCEDURE — 99213 OFFICE O/P EST LOW 20 MIN: CPT | Mod: 95,CR | Performed by: NURSE PRACTITIONER

## 2020-09-02 RX ORDER — ADAPALENE AND BENZOYL PEROXIDE GEL, 0.1%/2.5% 1; 25 MG/G; MG/G
GEL TOPICAL
COMMUNITY
End: 2021-11-17 | Stop reason: SDUPTHER

## 2020-09-02 RX ORDER — LISDEXAMFETAMINE DIMESYLATE CAPSULES 30 MG/1
30 CAPSULE ORAL EVERY MORNING
Qty: 30 CAP | Refills: 0 | Status: SHIPPED | OUTPATIENT
Start: 2020-10-03 | End: 2020-09-02 | Stop reason: SDUPTHER

## 2020-09-02 RX ORDER — LISDEXAMFETAMINE DIMESYLATE CAPSULES 30 MG/1
30 CAPSULE ORAL EVERY MORNING
Qty: 30 CAP | Refills: 0 | Status: SHIPPED | OUTPATIENT
Start: 2020-09-02 | End: 2020-09-02 | Stop reason: SDUPTHER

## 2020-09-02 RX ORDER — LISDEXAMFETAMINE DIMESYLATE CAPSULES 30 MG/1
30 CAPSULE ORAL EVERY MORNING
Qty: 30 CAP | Refills: 0 | Status: SHIPPED | OUTPATIENT
Start: 2020-11-03 | End: 2020-12-03

## 2020-09-02 RX ORDER — MINOCYCLINE 40 MG/G
AEROSOL, FOAM TOPICAL
COMMUNITY

## 2020-09-02 ASSESSMENT — FIBROSIS 4 INDEX: FIB4 SCORE: 0.43

## 2020-09-03 NOTE — ASSESSMENT & PLAN NOTE
Chronic.  Diagnosed end of high school. Was established with psychiatry in [place], taking Vyvanse 30 mg once daily, takes medication vacations regularly. No weight loss, heart palpitation, chest pain. Does report insomnia but this was an issue prior to starting Vyvanse.      His last dose of Vyvanse was yesterday.  He does report to using marijuana occasionally, around once per week.  No other illicit drugs.    Does take medication vacations regularly. Does notice about a 20 point increase in his blood pressure when on the medication, generally runs 100s systolic off medication, increases to 120-130s on medication, no higher.     Requesting refills today.

## 2020-09-03 NOTE — PROGRESS NOTES
Telemedicine Visit: Established Patient     This encounter was conducted via Zoom .   Verbal consent was obtained. Patient's identity was verified.    Subjective:   CC: Vyvanse refill  Kulwant Schultz is a 25 y.o. male presenting for evaluation and management of:    ADD (attention deficit disorder)  Chronic.  Diagnosed end of high school. Was established with psychiatry in [place], taking Vyvanse 30 mg once daily, takes medication vacations regularly. No weight loss, heart palpitation, chest pain. Does report insomnia but this was an issue prior to starting Vyvanse.      His last dose of Vyvanse was yesterday.  He does report to using marijuana occasionally, around once per week.  No other illicit drugs.    Does take medication vacations regularly. Does notice about a 20 point increase in his blood pressure when on the medication, generally runs 100s systolic off medication, increases to 120-130s on medication, no higher.     Requesting refills today.        ROS   Denies any recent fevers or chills. No nausea or vomiting. No chest pains or shortness of breath.     No Known Allergies    Current medicines (including changes today)  Current Outpatient Medications   Medication Sig Dispense Refill   • Adapalene-Benzoyl Peroxide (EPIDUO) 0.1-2.5 % Gel by Apply externally route.     • Minocycline HCl Micronized (AMZEEQ) 4 % Foam by Apply externally route.     • [START ON 11/3/2020] lisdexamfetamine (VYVANSE) 30 MG capsule Take 1 Cap by mouth every morning for 30 days. 30 Cap 0   • albuterol 108 (90 Base) MCG/ACT Aero Soln inhalation aerosol Inhale 2 Puffs by mouth every four hours as needed for Shortness of Breath. 1 Inhaler 3   • diphenhydrAMINE (BENADRYL) 25 MG Tab Take 25 mg by mouth every 6 hours as needed for Sleep.       No current facility-administered medications for this visit.        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   • Mild intermittent  "asthma without complication 03/04/2020   • Acne vulgaris 03/04/2020   • Post concussion syndrome 03/13/2019   • ADD (attention deficit disorder) 03/13/2019   • Enchondroma of finger of left hand 07/25/2018       Family History   Problem Relation Age of Onset   • Hypertension Mother    • Kidney Disease Mother    • Sleep Apnea Mother    • Dementia Maternal Grandmother 60        PD and AD   • Dementia Paternal Grandmother         Unknown   • Cancer Paternal Grandmother         leukemia   • No Known Problems Father    • Other Sister         eye issues   • Diabetes Maternal Aunt    • Diabetes Maternal Uncle    • Diabetes Maternal Grandfather        He  has a past medical history of ADD (attention deficit disorder), Asthma, and Concussion.  He  has no past surgical history on file.       Objective:   Ht 1.702 m (5' 7\")   Wt 71.2 kg (157 lb)   BMI 24.59 kg/m²     Physical Exam:  Constitutional: Alert, no distress, well-groomed.  Skin: No rashes in visible areas.  Eye: Round. Conjunctiva clear, lids normal. No icterus.   ENMT: Lips pink without lesions, good dentition, moist mucous membranes. Phonation normal.  Neck: No masses, no thyromegaly. Moves freely without pain.  CV: Pulse as reported by patient  Respiratory: Unlabored respiratory effort, no cough or audible wheeze  Psych: Alert and oriented x3, normal affect and mood.       Assessment and Plan:   The following treatment plan was discussed:     1. Attention deficit disorder (ADD) without hyperactivity  Stable  Refilled x 3 months  UDS and contract up to date   reviewed and consistent  - lisdexamfetamine (VYVANSE) 30 MG capsule; Take 1 Cap by mouth every morning for 30 days.  Dispense: 30 Cap; Refill: 0    Other orders  - Adapalene-Benzoyl Peroxide (EPIDUO) 0.1-2.5 % Gel; by Apply externally route.  - Minocycline HCl Micronized (AMZEEQ) 4 % Foam; by Apply externally route.        Follow-up: Return in about 3 months (around 12/2/2020) for Medication Refill.     "

## 2020-12-07 ENCOUNTER — TELEMEDICINE (OUTPATIENT)
Dept: MEDICAL GROUP | Facility: MEDICAL CENTER | Age: 25
End: 2020-12-07
Payer: COMMERCIAL

## 2020-12-07 VITALS — HEIGHT: 67 IN | WEIGHT: 160 LBS | BODY MASS INDEX: 25.11 KG/M2

## 2020-12-07 DIAGNOSIS — F98.8 ATTENTION DEFICIT DISORDER (ADD) WITHOUT HYPERACTIVITY: ICD-10-CM

## 2020-12-07 PROCEDURE — 99213 OFFICE O/P EST LOW 20 MIN: CPT | Mod: 95,CR | Performed by: NURSE PRACTITIONER

## 2020-12-07 ASSESSMENT — FIBROSIS 4 INDEX: FIB4 SCORE: 0.43

## 2020-12-08 NOTE — PROGRESS NOTES
Telemedicine Visit: Established Patient     This encounter was conducted via Zoom.   Verbal consent was obtained. Patient's identity was verified.    Subjective:   CC: Vyvanse refills  Kulwant Schultz is a 25 y.o. male presenting for evaluation and management of:    ADD (attention deficit disorder)  Chronic.  Diagnosed end of high school. Was established with psychiatry in [place], taking Vyvanse 30 mg once daily, takes medication vacations regularly. No weight loss, heart palpitation, chest pain. Does report insomnia but this was an issue prior to starting Vyvanse.      His last dose of Vyvanse was yesterday.  He does report to using marijuana occasionally, around once per week.  No other illicit drugs.    Does take medication vacations regularly. Does notice about a 20 point increase in his blood pressure when on the medication, generally runs 100s systolic off medication, increases to 120-130s on medication, no higher.     Requesting refills today.          ROS   Denies any recent fevers or chills. No nausea or vomiting. No chest pains or shortness of breath.     No Known Allergies    Current medicines (including changes today)  Current Outpatient Medications   Medication Sig Dispense Refill   • Adapalene-Benzoyl Peroxide (EPIDUO) 0.1-2.5 % Gel by Apply externally route.     • Minocycline HCl Micronized (AMZEEQ) 4 % Foam by Apply externally route.     • albuterol 108 (90 Base) MCG/ACT Aero Soln inhalation aerosol Inhale 2 Puffs by mouth every four hours as needed for Shortness of Breath. 1 Inhaler 3   • diphenhydrAMINE (BENADRYL) 25 MG Tab Take 25 mg by mouth every 6 hours as needed for Sleep.       No current facility-administered medications for this visit.        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   • Mild intermittent asthma without complication 03/04/2020   • Acne vulgaris 03/04/2020   • Post concussion syndrome 03/13/2019   • ADD  "(attention deficit disorder) 03/13/2019   • Enchondroma of finger of left hand 07/25/2018       Family History   Problem Relation Age of Onset   • Hypertension Mother    • Kidney Disease Mother    • Sleep Apnea Mother    • Dementia Maternal Grandmother 60        PD and AD   • Dementia Paternal Grandmother         Unknown   • Cancer Paternal Grandmother         leukemia   • No Known Problems Father    • Other Sister         eye issues   • Diabetes Maternal Aunt    • Diabetes Maternal Uncle    • Diabetes Maternal Grandfather        He  has a past medical history of ADD (attention deficit disorder), Asthma, and Concussion.  He  has no past surgical history on file.       Objective:   Ht 1.702 m (5' 7\")   Wt 72.6 kg (160 lb)   BMI 25.06 kg/m²     Physical Exam:  Constitutional: Alert, no distress, well-groomed.  Skin: No rashes in visible areas.  Eye: Round. Conjunctiva clear, lids normal. No icterus.   ENMT: Lips pink without lesions, good dentition, moist mucous membranes. Phonation normal.  Neck: No masses, no thyromegaly. Moves freely without pain.  CV: Pulse as reported by patient  Respiratory: Unlabored respiratory effort, no cough or audible wheeze  Psych: Alert and oriented x3, normal affect and mood.       Assessment and Plan:   The following treatment plan was discussed:     1. Attention deficit disorder (ADD) without hyperactivity  Stable  Last refill 12/1/2020   reviewed and consistent  UDS and contract up to date  Patient will be in [place] the entire month of January, he will see if he can find a pharmacy that will accept an out of start controlled substance electronically sent.     Follow-up: Return in about 3 months (around 3/7/2021) for Medication Refill.            "

## 2021-02-03 ENCOUNTER — TELEPHONE (OUTPATIENT)
Dept: MEDICAL GROUP | Facility: MEDICAL CENTER | Age: 26
End: 2021-02-03

## 2021-02-03 NOTE — TELEPHONE ENCOUNTER
ESTABLISHED PATIENT PRE-VISIT PLANNING     Patient was NOT contacted to complete PVP.     Note: Patient will not be contacted if there is no indication to call.     1.  Reviewed notes from the last few office visits within the medical group: Yes    2.  If any orders were placed at last visit or intended to be done for this visit (i.e. 6 mos follow-up), do we have Results/Consult Notes?         •  Labs - Labs were not ordered at last office visit.  Note: If patient appointment is for lab review and patient did not complete labs, check with provider if OK to reschedule patient until labs completed.       •  Imaging - Imaging was not ordered at last office visit.       •  Referrals - Referral ordered, patient has NOT been seen.    3. Is this appointment scheduled as a Hospital Follow-Up? No    4.  Immunizations were updated in Epic using Reconcile Outside Information activity? Yes    5.  Patient is due for the following Health Maintenance Topics:   Health Maintenance Due   Topic Date Due   • IMM PNEUMOCOCCAL VACCINE: 0-64 Years (1 of 1 - PPSV23) 07/12/2002   • IMM DTaP/Tdap/Td Vaccine (7 - Td) 05/17/2020   • IMM HPV VACCINE (3 - Male 3-dose series) 08/03/2020   • IMM INFLUENZA (1) 09/01/2020     6.  AHA (Pulse8) form printed for Provider? N/A

## 2021-02-04 ENCOUNTER — TELEMEDICINE (OUTPATIENT)
Dept: MEDICAL GROUP | Facility: MEDICAL CENTER | Age: 26
End: 2021-02-04
Payer: COMMERCIAL

## 2021-02-04 VITALS — HEIGHT: 67 IN | BODY MASS INDEX: 25.11 KG/M2 | WEIGHT: 160 LBS

## 2021-02-04 DIAGNOSIS — F41.9 ANXIETY: ICD-10-CM

## 2021-02-04 DIAGNOSIS — F98.8 ATTENTION DEFICIT DISORDER (ADD) WITHOUT HYPERACTIVITY: ICD-10-CM

## 2021-02-04 DIAGNOSIS — F43.21 SITUATIONAL DEPRESSION: ICD-10-CM

## 2021-02-04 PROCEDURE — 99214 OFFICE O/P EST MOD 30 MIN: CPT | Mod: 95,CR | Performed by: NURSE PRACTITIONER

## 2021-02-04 RX ORDER — LISDEXAMFETAMINE DIMESYLATE 30 MG/1
30 CAPSULE ORAL EVERY MORNING
Qty: 30 CAP | Refills: 0 | Status: SHIPPED | OUTPATIENT
Start: 2021-02-04 | End: 2021-02-04 | Stop reason: SDUPTHER

## 2021-02-04 RX ORDER — LISDEXAMFETAMINE DIMESYLATE 30 MG/1
30 CAPSULE ORAL EVERY MORNING
Qty: 30 CAP | Refills: 0 | Status: SHIPPED | OUTPATIENT
Start: 2021-02-04 | End: 2021-03-06

## 2021-02-04 RX ORDER — HYDROXYZINE HYDROCHLORIDE 25 MG/1
12.5-25 TABLET, FILM COATED ORAL 3 TIMES DAILY PRN
Qty: 30 TAB | Refills: 3 | Status: SHIPPED | OUTPATIENT
Start: 2021-02-04

## 2021-02-04 ASSESSMENT — ANXIETY QUESTIONNAIRES
1. FEELING NERVOUS, ANXIOUS, OR ON EDGE: NEARLY EVERY DAY
7. FEELING AFRAID AS IF SOMETHING AWFUL MIGHT HAPPEN: NEARLY EVERY DAY
2. NOT BEING ABLE TO STOP OR CONTROL WORRYING: NEARLY EVERY DAY
4. TROUBLE RELAXING: NEARLY EVERY DAY
3. WORRYING TOO MUCH ABOUT DIFFERENT THINGS: NEARLY EVERY DAY
5. BEING SO RESTLESS THAT IT IS HARD TO SIT STILL: NOT AT ALL
6. BECOMING EASILY ANNOYED OR IRRITABLE: SEVERAL DAYS
GAD7 TOTAL SCORE: 16

## 2021-02-04 ASSESSMENT — FIBROSIS 4 INDEX: FIB4 SCORE: 0.43

## 2021-02-04 ASSESSMENT — PATIENT HEALTH QUESTIONNAIRE - PHQ9
SUM OF ALL RESPONSES TO PHQ QUESTIONS 1-9: 12
5. POOR APPETITE OR OVEREATING: 0 - NOT AT ALL
CLINICAL INTERPRETATION OF PHQ2 SCORE: 6
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
## 2021-02-04 NOTE — ASSESSMENT & PLAN NOTE
New to me today. Due to significant life stressors. Reports that he had been on prozac in the past. Would like to avoid this type of medication for now.     Depression Screen (PHQ-2/PHQ-9) 3/13/2019 3/4/2020 2/4/2021   PHQ-2 Total Score 0 1 6   PHQ-9 Total Score - 1 12       Interpretation of PHQ-9 Total Score   Score Severity   10-14 Moderate Depression
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
## 2021-02-24 ENCOUNTER — TELEPHONE (OUTPATIENT)
Dept: MEDICAL GROUP | Facility: MEDICAL CENTER | Age: 26
End: 2021-02-24

## 2021-02-24 NOTE — TELEPHONE ENCOUNTER
ESTABLISHED PATIENT PRE-VISIT PLANNING     Patient was NOT contacted to complete PVP.     Note: Patient will not be contacted if there is no indication to call.     1.  Reviewed notes from the last few office visits within the medical group: Yes    2.  If any orders were placed at last visit or intended to be done for this visit (i.e. 6 mos follow-up), do we have Results/Consult Notes?         •  Labs - Labs were not ordered at last office visit.  Note: If patient appointment is for lab review and patient did not complete labs, check with provider if OK to reschedule patient until labs completed.       •  Imaging - Imaging was not ordered at last office visit.       •  Referrals - Referral ordered, patient has NOT been seen.    3. Is this appointment scheduled as a Hospital Follow-Up? No    4.  Immunizations were updated in Epic using Reconcile Outside Information activity? Yes    5.  Patient is due for the following Health Maintenance Topics:   Health Maintenance Due   Topic Date Due   • IMM PNEUMOCOCCAL VACCINE: 0-64 Years (1 of 1 - PPSV23) Never done   • IMM DTaP/Tdap/Td Vaccine (7 - Td) 05/17/2020   • IMM HPV VACCINE (3 - Male 3-dose series) 08/03/2020     6.  AHA (Pulse8) form printed for Provider? N/A         Outside information NOT reconciled using the Sociall feature. Per Liliya Bunch, the Sociall feature is down as of 02/09/2021 at 2:00pm. Will reconcile outside information at a later date.

## 2021-03-05 ENCOUNTER — TELEMEDICINE (OUTPATIENT)
Dept: MEDICAL GROUP | Facility: MEDICAL CENTER | Age: 26
End: 2021-03-05
Payer: COMMERCIAL

## 2021-03-05 VITALS — HEIGHT: 67 IN | BODY MASS INDEX: 25.11 KG/M2 | WEIGHT: 160 LBS

## 2021-03-05 DIAGNOSIS — F41.9 ANXIETY: ICD-10-CM

## 2021-03-05 PROCEDURE — 99213 OFFICE O/P EST LOW 20 MIN: CPT | Performed by: NURSE PRACTITIONER

## 2021-03-05 RX ORDER — BUSPIRONE HYDROCHLORIDE 10 MG/1
10 TABLET ORAL 2 TIMES DAILY PRN
Qty: 60 TABLET | Refills: 6 | Status: SHIPPED | OUTPATIENT
Start: 2021-03-05 | End: 2021-05-27

## 2021-03-05 ASSESSMENT — FIBROSIS 4 INDEX: FIB4 SCORE: 0.43
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
## 2021-05-20 ENCOUNTER — TELEPHONE (OUTPATIENT)
Dept: MEDICAL GROUP | Facility: MEDICAL CENTER | Age: 26
End: 2021-05-20

## 2021-05-20 NOTE — TELEPHONE ENCOUNTER
ESTABLISHED PATIENT PRE-VISIT PLANNING     Patient was NOT contacted to complete PVP.     Note: Patient will not be contacted if there is no indication to call.       1.  Reviewed notes from the last few office visits within the medical group: Yes    2.  If any orders were placed at last visit or intended to be done for this visit (i.e. 6 mos follow-up), do we have Results/Consult Notes?   · Labs - Labs were not ordered at last office visit.   Note: If patient appointment is for lab review and patient did not complete labs, check with provider if OK to reschedule patient until labs completed.  · Imaging - Imaging was not ordered at last office visit.  · Referrals - No referrals were ordered at last office visit.    3. Is this appointment scheduled as a Hospital Follow-Up? No    4.  Immunizations were updated in Epic using [employer] Outside Information activity? Yes    5.  Patient is due for the following Health Maintenance Topics:   Health Maintenance Due   Topic Date Due   • IMM PNEUMOCOCCAL VACCINE: 0-64 Years (1 of 2 - PPSV23) Never done   • IMM DTaP/Tdap/Td Vaccine (7 - Td) 05/17/2020   • IMM HPV VACCINE (3 - Male 3-dose series) 08/03/2020       6.  AHA (Pulse8) form printed for Provider? N/A       ---------------------------------------------------------------------------------------------  This Pre-Visit Planning note has been created for the Provider and Medical Assistant to review prior to the patient's office appointment.   Patient is NOT REQUIRED to follow-up on this note.

## 2021-05-27 ENCOUNTER — OFFICE VISIT (OUTPATIENT)
Dept: MEDICAL GROUP | Facility: MEDICAL CENTER | Age: 26
End: 2021-05-27
Payer: COMMERCIAL

## 2021-05-27 VITALS
BODY MASS INDEX: 27.47 KG/M2 | OXYGEN SATURATION: 98 % | DIASTOLIC BLOOD PRESSURE: 74 MMHG | TEMPERATURE: 98.4 F | SYSTOLIC BLOOD PRESSURE: 116 MMHG | WEIGHT: 175 LBS | HEART RATE: 78 BPM | HEIGHT: 67 IN

## 2021-05-27 DIAGNOSIS — Z79.899 ON STIMULANT MEDICATION: ICD-10-CM

## 2021-05-27 DIAGNOSIS — F41.9 ANXIETY: ICD-10-CM

## 2021-05-27 DIAGNOSIS — F98.8 ATTENTION DEFICIT DISORDER (ADD) WITHOUT HYPERACTIVITY: ICD-10-CM

## 2021-05-27 DIAGNOSIS — F43.21 SITUATIONAL DEPRESSION: ICD-10-CM

## 2021-05-27 PROCEDURE — 99214 OFFICE O/P EST MOD 30 MIN: CPT | Performed by: NURSE PRACTITIONER

## 2021-05-27 RX ORDER — CITALOPRAM HYDROBROMIDE 10 MG/1
10 TABLET ORAL DAILY
Qty: 30 TABLET | Refills: 11 | Status: SHIPPED | OUTPATIENT
Start: 2021-05-27 | End: 2021-08-26

## 2021-05-27 RX ORDER — LISDEXAMFETAMINE DIMESYLATE CAPSULES 30 MG/1
30 CAPSULE ORAL EVERY MORNING
Qty: 30 CAPSULE | Refills: 0 | Status: SHIPPED | OUTPATIENT
Start: 2021-07-28 | End: 2021-08-27

## 2021-05-27 RX ORDER — LISDEXAMFETAMINE DIMESYLATE CAPSULES 30 MG/1
30 CAPSULE ORAL EVERY MORNING
Qty: 30 CAPSULE | Refills: 0 | Status: SHIPPED | OUTPATIENT
Start: 2021-06-27 | End: 2021-05-27 | Stop reason: SDUPTHER

## 2021-05-27 RX ORDER — LISDEXAMFETAMINE DIMESYLATE CAPSULES 30 MG/1
30 CAPSULE ORAL EVERY MORNING
Qty: 30 CAPSULE | Refills: 0 | Status: SHIPPED | OUTPATIENT
Start: 2021-05-27 | End: 2021-05-27 | Stop reason: SDUPTHER

## 2021-05-27 RX ORDER — LISDEXAMFETAMINE DIMESYLATE 30 MG/1
30 CAPSULE ORAL EVERY MORNING
Qty: 30 CAPSULE | Refills: 0 | Status: CANCELLED | OUTPATIENT
Start: 2021-05-27

## 2021-05-27 NOTE — ASSESSMENT & PLAN NOTE
Chronic. Due to significant life stressors. Reports that he had been on prozac in the past.    Had a significant bout of depression recently but feels that that has passed. He reports that he finds it difficult to get out of bed in the morning until he has taken his Vyvanse.     He would be willing to restart an SSRI.     Depression Screen (PHQ-2/PHQ-9) 2/4/2021   PHQ-2 Total Score 6   PHQ-9 Total Score 12       Interpretation of PHQ-9 Total Score   Score Severity   10-14 Moderate Depression
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
## 2021-06-03 DIAGNOSIS — F98.8 ATTENTION DEFICIT DISORDER (ADD) WITHOUT HYPERACTIVITY: ICD-10-CM

## 2021-06-03 DIAGNOSIS — Z79.899 ON STIMULANT MEDICATION: ICD-10-CM

## 2021-06-10 ENCOUNTER — OFFICE VISIT (OUTPATIENT)
Dept: BEHAVIORAL HEALTH | Facility: CLINIC | Age: 26
End: 2021-06-10
Payer: COMMERCIAL

## 2021-06-10 DIAGNOSIS — F90.0 ATTENTION DEFICIT HYPERACTIVITY DISORDER (ADHD), PREDOMINANTLY INATTENTIVE TYPE: ICD-10-CM

## 2021-06-10 DIAGNOSIS — F41.9 ANXIETY DISORDER, UNSPECIFIED TYPE: ICD-10-CM

## 2021-06-10 PROCEDURE — 90791 PSYCH DIAGNOSTIC EVALUATION: CPT | Performed by: SOCIAL WORKER

## 2021-06-10 ASSESSMENT — PATIENT HEALTH QUESTIONNAIRE - PHQ9
SUM OF ALL RESPONSES TO PHQ QUESTIONS 1-9: 17
5. POOR APPETITE OR OVEREATING: 1 - SEVERAL DAYS
CLINICAL INTERPRETATION OF PHQ2 SCORE: 3
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
## 2021-06-22 ENCOUNTER — OFFICE VISIT (OUTPATIENT)
Dept: BEHAVIORAL HEALTH | Facility: CLINIC | Age: 26
End: 2021-06-22
Payer: COMMERCIAL

## 2021-06-22 DIAGNOSIS — F90.0 ATTENTION DEFICIT HYPERACTIVITY DISORDER (ADHD), PREDOMINANTLY INATTENTIVE TYPE: ICD-10-CM

## 2021-06-22 PROCEDURE — 90837 PSYTX W PT 60 MINUTES: CPT | Performed by: SOCIAL WORKER

## 2021-06-22 NOTE — PROGRESS NOTES
"[facility]   Therapy Progress Note        Name: Kulwant Schultz  MRN: [id number]  : 1995  Age: 25 y.o.  Date of assessment: 2021  PCP: LOTTIE Valencia  Persons in attendance: Patient  Total session time: 60 minutes      Topics addressed in psychotherapy include: Pt to indiv therapy session. Some mood fluctuations, depressed last week, on \"upswing,\" feels he does not get as much accomplished when off Vyvanse.  Pt focused on negative voices in his head and where they come from and their role in mood and accomplishments. Processed changing the record in his head. Pt continues to maintain sobriety from substances, porn, social media for 1 month.  Plan: See bi weekly.     Objective Observations:   Participation:Active verbal participation, Attentive, Engaged and Open to feedback   Grooming:Neat   Cognition:Fully Oriented   Eye Contact:Good   Mood:Euthymic   Affect:Congruent with content   Thought Process:Goal-directed   Speech:Rate within normal limits and Volume within normal limits    Current Risk:   Suicide: low   Homicide: NA   Self-Harm: NA   Relapse: moderate for mood   Safety Plan Reviewed: NA    Care Plan Updated: No    Does patient express agreement with the above plan? Yes     Diagnosis:  1. Anxiety  2. ADHD, without hyperactivity  3. Cannabis, use continuous (clean 30 days)    Therapeutic Intervention(s): Cognitive modification, Conflict clarification and Goal-setting    Treatment Goal(s)/Objective(s) addressed:  Mood stability, goal setting.      Progress toward Treatment Goals: No change    Referral appointment(s) scheduled? No       Mitra Loya L.C.S.W.    "

## 2021-07-07 ENCOUNTER — OFFICE VISIT (OUTPATIENT)
Dept: BEHAVIORAL HEALTH | Facility: CLINIC | Age: 26
End: 2021-07-07
Payer: COMMERCIAL

## 2021-07-07 DIAGNOSIS — F90.0 ATTENTION DEFICIT HYPERACTIVITY DISORDER (ADHD), PREDOMINANTLY INATTENTIVE TYPE: ICD-10-CM

## 2021-07-07 PROCEDURE — 90837 PSYTX W PT 60 MINUTES: CPT | Performed by: SOCIAL WORKER

## 2021-07-07 NOTE — PROGRESS NOTES
"[facility]   Therapy Progress Note        Name: Kulwant Schultz  MRN: [id number]  : 1995  Age: 25 y.o.  Date of assessment: 2021  PCP: LOTTIE Valencia  Persons in attendance: Patient  Total session time: 60 minutes      Topics addressed in psychotherapy include: Pt to indiv in office appt. Pt continues to be stuck with numerous ideas about which way to proceed with school, interests. Assisted pt with identifying ADHD thought processing and being overwhelmed and feeling he doesn't get things accomplished and \"beats self up\" over making no progress. Pt continues to smoke marijuana 5-6 days/wk which he agrees diminishes motivation but reward of feeling \"relaxed\" after working is motivator as well as being with friends playing video games. Plan: See biweekly to address motivations, goals, self esteem.        Objective Observations:   Participation:Active verbal participation and Engaged   Grooming:Casual   Cognition:Fully Oriented   Eye Contact:Good   Mood:Euthymic   Affect:Flexible   Thought Process:Logical   Speech:Rate within normal limits and Volume within normal limits    Current Risk:   Suicide: NA   Homicide: NA   Self-Harm: NA   Relapse: NA   Safety Plan Reviewed: NA    Care Plan Updated: No    Does patient express agreement with the above plan? No     Diagnosis:  ADHD  Situational Depression   Anxiety    Therapeutic Intervention(s): Cognitive modification, Goal-setting and Interpersonal effectiveness skills    Treatment Goal(s)/Objective(s) addressed: goal setting, ADHD symptoms, self esteem.      Progress toward Treatment Goals: Mild improvement    Referral appointment(s) scheduled? No       Mitra Loya, LCSW, MSW    "

## 2021-07-23 ENCOUNTER — OFFICE VISIT (OUTPATIENT)
Dept: BEHAVIORAL HEALTH | Facility: CLINIC | Age: 26
End: 2021-07-23
Payer: COMMERCIAL

## 2021-07-23 DIAGNOSIS — F90.0 ATTENTION DEFICIT HYPERACTIVITY DISORDER (ADHD), PREDOMINANTLY INATTENTIVE TYPE: ICD-10-CM

## 2021-07-23 DIAGNOSIS — F12.10 CANNABIS ABUSE: ICD-10-CM

## 2021-07-23 DIAGNOSIS — F33.1 MODERATE EPISODE OF RECURRENT MAJOR DEPRESSIVE DISORDER (HCC): ICD-10-CM

## 2021-07-23 DIAGNOSIS — F41.1 GAD (GENERALIZED ANXIETY DISORDER): ICD-10-CM

## 2021-07-23 PROCEDURE — 99205 OFFICE O/P NEW HI 60 MIN: CPT | Performed by: PSYCHIATRY & NEUROLOGY

## 2021-07-23 RX ORDER — LISDEXAMFETAMINE DIMESYLATE CAPSULES 30 MG/1
30 CAPSULE ORAL EVERY MORNING
Qty: 30 CAPSULE | Refills: 0 | Status: SHIPPED | OUTPATIENT
Start: 2021-07-23 | End: 2021-08-22

## 2021-07-23 ASSESSMENT — ANXIETY QUESTIONNAIRES
2. NOT BEING ABLE TO STOP OR CONTROL WORRYING: SEVERAL DAYS
3. WORRYING TOO MUCH ABOUT DIFFERENT THINGS: SEVERAL DAYS
1. FEELING NERVOUS, ANXIOUS, OR ON EDGE: SEVERAL DAYS
7. FEELING AFRAID AS IF SOMETHING AWFUL MIGHT HAPPEN: NOT AT ALL
6. BECOMING EASILY ANNOYED OR IRRITABLE: SEVERAL DAYS
4. TROUBLE RELAXING: NOT AT ALL
GAD7 TOTAL SCORE: 4
5. BEING SO RESTLESS THAT IT IS HARD TO SIT STILL: NOT AT ALL

## 2021-07-23 ASSESSMENT — PATIENT HEALTH QUESTIONNAIRE - PHQ9
CLINICAL INTERPRETATION OF PHQ2 SCORE: 2
SUM OF ALL RESPONSES TO PHQ QUESTIONS 1-9: 12
5. POOR APPETITE OR OVEREATING: 0 - NOT AT ALL

## 2021-07-23 NOTE — PROGRESS NOTES
"IN-PERSON SESSION IN CLINIC      INITIAL PSYCHIATRIC EVALUATION      This provider informed the patient their medical records are totally confidential except for the use by other providers involved in their care, or if the patient signs a release, or to report instances of child or elder abuse, or if it is determined they are an immediate risk to harm themselves or others.      CHIEF COMPLAINT  \" Need help with mood and anxiety\"      HISTORY OF PRESENT ILLNESS  Kulwant Schultz is a 26 y.o. old male comes in today to establish care and for evaluation of depression, anxiety and inattention.  I did reviewed all outpatient psychiatry follow up notes over last 3 years. Patient is new to the clinic.  Patient reports getting diagnosis of ADD during school time as he struggle with education and was placed and academic probation and was getting mostly C grades.  After initiating treatment with Vyvanse patient was able to get a grades in college.  Patient has started noticing depression over the last 2 years with low energy, increased sleep, not decline and interest, feelings of guilt and poor concentration but denies endorsing suicidal or homicidal ideations.  Patient denies current or past history of yi, hypomania or psychosis.  Patient does report hearing his own voices and describes them as random and rapid but after Vyvanse they get streamline and he is able to think straight and talk about his thoughts effectively.  Discussed that this is likely due to the underlying ADD and not a psychotic process.  Patient also reports struggling with anxiety but describes this as less than depression.  Patient describes anxiety as feeling nervous and worrying about multiple topics and feeling irritable but describes self as not an angry or irritable person.    Patient is currently on Vyvanse 30 mg and stopped Wellbutrin 2 weeks ago.  Patient agreed with plan of initiating Zoloft for mood and anxiety management and " continuing Vyvanse at current dosage but agreed with future plan of titrating Vyvanse further if indicated in upcoming sessions.    Patient reports smoking cannabis 5 days a week and extensive psychoeducation given on the negative impact of cannabis on mood and anxiety and risk of amotivational syndrome and psychosis.  Patient is willing to cut down to the best of his ability.  I also educated him to read about cannabis amotivational syndrome in detail.      PSYCHIATRIC REVIEW OF SYSTEMS: denies manic symptoms, denies psychotic symptoms including AH / VH, denies OCD symptoms, denies restrictive eating or purging, denies trauma related symptoms, see HPI for depressive symptoms and see HPI for anxeity symptoms      MEDICAL REVIEW OF SYSTEMS:   Constitutional negative   Eyes negative   Ears/Nose/Mouth/Throat negative   Cardiovascular negative   Respiratory asthma   Gastrointestinal negative   Genitourinary negative   Muscular negative   Integumentary negative   Neurological negative   Endocrine negative   Hematologic/Lymphatic negative     CURRENT MEDICATIONS:  Current Outpatient Medications   Medication Sig Dispense Refill   • Minoxidil (ROGAINE EX) Apply  topically.     • citalopram (CELEXA) 10 MG tablet Take 1 tablet by mouth every day. 30 tablet 11   • [START ON 7/28/2021] lisdexamfetamine (VYVANSE) 30 MG capsule Take 1 capsule by mouth every morning for 30 days. 30 capsule 0   • hydrOXYzine HCl (ATARAX) 25 MG Tab Take 0.5-1 Tabs by mouth 3 times a day as needed for Anxiety. 30 Tab 3   • Adapalene-Benzoyl Peroxide (EPIDUO) 0.1-2.5 % Gel by Apply externally route.     • Minocycline HCl Micronized (AMZEEQ) 4 % Foam by Apply externally route.     • albuterol 108 (90 Base) MCG/ACT Aero Soln inhalation aerosol Inhale 2 Puffs by mouth every four hours as needed for Shortness of Breath. 1 Inhaler 3   • diphenhydrAMINE (BENADRYL) 25 MG Tab Take 25 mg by mouth every 6 hours as needed for Sleep.       No current  facility-administered medications for this visit.       ALLERGIES:  Patient has no known allergies.    PAST PSYCHIATRIC HISTORY  Prior psychiatric hospitalization: no  Prior Self harm/suicide attempt: no  Prior Diagnosis: ADD    PAST PSYCHIATRIC MEDICATIONS  • Celexa  • Wellbutrin  • Vyvanse  • Adderall  • Hydroxyzine     FAMILY HISTORY  Psychiatric diagnosis:  None diagnosed  History of suicide attempts:  no  Substance abuse history:  no    SUBSTANCE USE HISTORY:  ALCOHOL: occasional  TOBACCO: no  CANNABIS: 5 days/week  OPIOIDS: no  PRESCRIPTION MEDICATIONS: no  OTHERS: no  History of inpatient/outpatient rehab treatment: no    SOCIAL HISTORY  Childhood: born in [place]   Parents  when patient was young close to 2 years  Stayed close to mother while growing up  Education: College graduate  Was an academic probation and school  Employment: [employer]  Relationship: Single  Kids: None  Current living situation: Roommates   Current/past legal issues: denies  History of emotional/physical/sexual abuse - denies      MEDICAL HISTORY  Past Medical History:   Diagnosis Date   • ADD (attention deficit disorder)    • Asthma    • Concussion      History reviewed. No pertinent surgical history.      PHYSICAL EXAMINAION:  Vital signs: There were no vitals taken for this visit.  Musculoskeletal: Normal gait.   Abnormal movements: none    MENTAL STATUS EXAMINATION      General:   - Grooming and hygiene: Casual,   - Apparent distress: none,   - Behavior: Calm  - Eye Contact:  Good,   - no psychomotor agitation or retardation    - Participation: Active verbal participation  Orientation: Alert and Fully Oriented to person, place and time  Mood: Depressed and Anxious  Affect: Full range,  Thought Process: Goal-directed  Thought Content: Denies suicidal or homicidal ideations, intent or plan Within normal limits  Perception: Denies auditory or visual hallucinations. No delusions noted Within normal  limits  Attention span and concentration: Intact   Speech:Rate within normal limits and Volume within normal limits  Language: Appropriate   Insight: Good  Judgment: Good  Recent and remote memory: No gross evidence of memory deficits      DEPRESSION SCREENING:  Depression Screen (PHQ-2/PHQ-9) 3/4/2020 2/4/2021 6/10/2021   PHQ-2 Total Score 1 6 3   PHQ-9 Total Score 1 12 17       Interpretation of PHQ-9 Total Score   Score Severity   1-4 No Depression   5-9 Mild Depression   10-14 Moderate Depression   15-19 Moderately Severe Depression   20-27 Severe Depression      SAFETY ASSESSMENT - SELF:    Does patient acknowledge current or past symptoms of dangerousness to self? no  History of suicide by family member: no  History of suicide by friend/significant other: no  Recent change in amount/specificity/intensity of suicidal thoughts or self-harm behavior? no  Current access to firearms, medications, or other identified means of suicide/self-harm? no  Protective factors present: family, work       SAFETY ASSESSMENT - OTHERS:    Does patient acknowledge current or past symptoms of aggressive behavior or risk to others? no  Recent change in amount/specificity/intensity of thoughts or threats to harm others? no  Current access to firearms/other identified means of harm? no      CURRENT RISK:       Suicidal: Low       Homicidal: Low       Self-Harm: Low       Relapse: Low       Crisis Safety Plan Reviewed Not Indicated    MEDICAL RECORDS/LABS/DIAGNOSTIC TESTS REVIEWED:  Reviewed      [facility] records -   Reviewed      ASSESSMENT  Kulwant Jim Willard Marion is a 26 y.o. old male comes in today for evaluation of depression, anxiety and inattention.  Patient is meeting criteria for major depressive disorder with anxiety and ADHD.  Patient agreed with treatment recommendation as discussed below and continuing psychotherapy and working on cutting down the cannabis use.      DIFFERENTIAL DIAGNOSES  1. Major depressive  disorder  2. ADHD  3. Generalized anxiety disorder  4. Cannabis abuse      PLAN:  (1) Major depressive disorder  • PHQ9: 12  • Add Zoloft 25 mg daily for 1 week and then increase the dose to 50 mg daily for mood and anxiety management.  5-week supply given with no refill.  • Continue Vyvanse 30 mg daily for ADHD management.  30 tabs given with no refill.  • Patient signed the controlled medication treatment agreement in the clinic today.  • Continue psychotherapy in the clinic for mood and anxiety management.  • Motivated to cut down and stop cannabis use.  • Medication options, alternatives (including no medications) and medication risks/benefits/side effects were discussed in detail.  • The patient was advised to call, message provider on [employer], or come in to the clinic if symptoms worsen or if any future questions/issues regarding their medications arise; the patient verbalized understanding and agreement.    • The patient was educated to call 911, call the suicide hotline, or go to local ER if having thoughts of suicide or homicide; verbalized understanding.    (2) ADHD  • Continue Vyvanse 30 mg daily for ADHD management.  30 tabs given with no refill.  • Patient signed the controlled medication treatment agreement in the clinic today.  • Continue psychotherapy in the clinic for mood and anxiety management.  • Motivated to cut down and stop cannabis use.    (2) Generalized anxiety disorder  • GAD7: 4  • Add Zoloft 25 mg daily for 1 week and then increase the dose to 50 mg daily for mood and anxiety management.  5-week supply given with no refill.  • Continue Vyvanse 30 mg daily for ADHD management.  30 tabs given with no refill.  • Patient signed the controlled medication treatment agreement in the clinic today.  • Continue psychotherapy in the clinic for mood and anxiety management.  • Motivated to cut down and stop cannabis use.    Return to clinic in 5 weeks or sooner if symptoms worsen.  Next Appointment:   instruction provided on how to make the next appointment.     Billin: based on total time spent of 60 minutes (7:40 to 8:40 am) on evaluation, chart review, documentation, doing scales and reviewing controlled medication treatment agreement.    The proposed treatment plan was discussed with the patient who was provided the opportunity to ask questions and make suggestions regarding alternative treatment. Patient verbalized understanding and expressed agreement with the plan.     Thank you for allowing me to participate in the care of this patient.    Amol Hawkins M.D.  21    CC:   LOTTIE Valencia    This note was created using voice recognition software (Dragon). The accuracy of the dictation is limited by the abilities of the software. I have reviewed the note prior to signing, however some errors in grammar and context are still possible. If you have any questions related to this note please do not hesitate to contact our office.

## 2021-07-28 ENCOUNTER — OFFICE VISIT (OUTPATIENT)
Dept: BEHAVIORAL HEALTH | Facility: CLINIC | Age: 26
End: 2021-07-28
Payer: COMMERCIAL

## 2021-07-28 DIAGNOSIS — F33.1 MODERATE EPISODE OF RECURRENT MAJOR DEPRESSIVE DISORDER (HCC): ICD-10-CM

## 2021-07-28 PROCEDURE — 90837 PSYTX W PT 60 MINUTES: CPT | Performed by: SOCIAL WORKER

## 2021-07-28 NOTE — PROGRESS NOTES
[facility]   Therapy Progress Note        Name: Kulwant Schultz  MRN: [id number]  : 1995  Age: 26 y.o.  Date of assessment: 2021  PCP: LOTTIE Valencia  Persons in attendance: Patient  Total session time: 60 minutes      Topics addressed in psychotherapy include: Pt to in office appt. Pt and writer explored relationship from past in which he feels he was controlling but also feeling inadequate which translated into guilt/mean behaviors. Pt experiences similar response (anger) with relationship with father.   Continue working on self/interpersonal effectiveness. Plan: See biweekly.     Objective Observations:   Participation:Active verbal participation, Attentive, Engaged and Open to feedback   Grooming:Casual   Cognition:Fully Oriented   Eye Contact:Good   Mood:Euthymic   Affect:Flexible   Thought Process:Logical   Speech:Rate within normal limits and Volume within normal limits    Current Risk:   Suicide: low   Homicide: NA   Self-Harm: NA   Relapse: NA   Safety Plan Reviewed: NA    Care Plan Updated: No    Does patient express agreement with the above plan? Yes     Diagnosis:  No diagnosis found.    Therapeutic Intervention(s): Cognitive modification, Communication skills and Interpersonal effectiveness skills    Treatment Goal(s)/Objective(s) addressed: self actualization     Progress toward Treatment Goals: Mild improvement    Referral appointment(s) scheduled? No       Mitra Loya, LCSW, MSW

## 2021-08-09 ENCOUNTER — OFFICE VISIT (OUTPATIENT)
Dept: BEHAVIORAL HEALTH | Facility: CLINIC | Age: 26
End: 2021-08-09
Payer: COMMERCIAL

## 2021-08-09 DIAGNOSIS — F33.1 MODERATE EPISODE OF RECURRENT MAJOR DEPRESSIVE DISORDER (HCC): ICD-10-CM

## 2021-08-09 DIAGNOSIS — F41.1 GAD (GENERALIZED ANXIETY DISORDER): ICD-10-CM

## 2021-08-09 DIAGNOSIS — F90.0 ATTENTION DEFICIT HYPERACTIVITY DISORDER (ADHD), PREDOMINANTLY INATTENTIVE TYPE: ICD-10-CM

## 2021-08-09 PROCEDURE — 90837 PSYTX W PT 60 MINUTES: CPT | Performed by: SOCIAL WORKER

## 2021-08-09 NOTE — PROGRESS NOTES
"[facility]   Therapy Progress Note        Name: Kulwant Schultz  MRN: [id number]  : 1995  Age: 26 y.o.  Date of assessment: 2021  PCP: LOTTIE Valencia  Persons in attendance: Patient  Total session time: 60 minutes      Topics addressed in psychotherapy include: Pt t in office indiv appt. Pt initiated  restarting Zoloft due to high level of depression due to family issues, responsibilities with apts in [place], rejection from job interview. Pt continues to feel lack of motivation to initiate with \"some higgins.\" Pt also continues to use marijuana and possibly psilocybin, discussed effect on motivation, anxiety, depression; discussed stopping substances for 30 days to get baseline on medication efficacy. Assisted pt with negative vs positive thinking errors, inner dialogue, as it relates to self esteem. Pt gets easily side tracked in conversation. Plan: See bi weekly.     Objective Observations:   Participation:Active verbal participation, Attentive and Engaged, some inability to stay on track    Grooming:Neat   Cognition:Fully Oriented   Eye Contact:Good   Mood:Depressed   Affect:Congruent with content   Thought Process:Logical and Flight of ideas   Speech:Rate within normal limits and Volume within normal limits    Current Risk:   Suicide: low   Homicide: NA   Self-Harm: NA   Relapse: NA   Safety Plan Reviewed: NA    Care Plan Updated: No    Does patient express agreement with the above plan? Yes     Diagnosis:  No diagnosis found.    Therapeutic Intervention(s): Cognitive modification, Communication skills, Interpersonal effectiveness skills and Parenting/familial roles addressed    Treatment Goal(s)/Objective(s) addressed: self esteem, thinking errors     Progress toward Treatment Goals: Mild improvement    Referral appointment(s) scheduled? No       Mitra Loya, LCSW, MSW    "

## 2021-08-23 ENCOUNTER — OFFICE VISIT (OUTPATIENT)
Dept: BEHAVIORAL HEALTH | Facility: CLINIC | Age: 26
End: 2021-08-23
Payer: COMMERCIAL

## 2021-08-23 DIAGNOSIS — F33.1 MODERATE EPISODE OF RECURRENT MAJOR DEPRESSIVE DISORDER (HCC): ICD-10-CM

## 2021-08-23 PROCEDURE — 90837 PSYTX W PT 60 MINUTES: CPT | Performed by: SOCIAL WORKER

## 2021-08-23 NOTE — PROGRESS NOTES
"[facility]   Therapy Progress Note        Name: Kulwant Schultz  MRN: [id number]  : 1995  Age: 26 y.o.  Date of assessment: 2021  PCP: LOTTIE Valencia  Persons in attendance: Patient  Total session time: 60 minutes       Topics addressed in psychotherapy include: Pt to in office indiv appt. Pt mood bright. Pt found \"list\" he had written 1 yr ago; processed fears of not being successful, loosing mother and life as child when parents  and he felt alone/lonely. Assisted pt with ways to recreate feelings/emotions he feels he missed out on. Continue to review list, challenge thinking errors. Homework to visualize life the way he would want it. Plan: See biweekly.      Objective Observations:   Participation:Active verbal participation, Engaged and Open to feedback   Grooming:Casual   Cognition:Fully Oriented   Eye Contact:Good   Mood:Euthymic    Affect:Bright   Thought Process:Logical and Goal-directed   Speech:Rate within normal limits and Volume within normal limits    Current Risk:   Suicide: low   Homicide: NA   Self-Harm: NA   Relapse: NA   Safety Plan Reviewed: NA    Care Plan Updated: No    Does patient express agreement with the above plan? Yes     Diagnosis:  1. Moderate recurrent major depression    Therapeutic Intervention(s): Cognitive modification, Interpersonal effectiveness skills and Parenting/familial roles addressed    Treatment Goal(s)/Objective(s) addressed: depression, self worth     Progress toward Treatment Goals: Mild improvement    Referral appointment(s) scheduled? No       Mitra Loya, LCSW, MSW    "

## 2021-08-26 ENCOUNTER — TELEMEDICINE (OUTPATIENT)
Dept: BEHAVIORAL HEALTH | Facility: CLINIC | Age: 26
End: 2021-08-26
Payer: COMMERCIAL

## 2021-08-26 DIAGNOSIS — G47.09 OTHER INSOMNIA: ICD-10-CM

## 2021-08-26 DIAGNOSIS — F41.1 GAD (GENERALIZED ANXIETY DISORDER): ICD-10-CM

## 2021-08-26 DIAGNOSIS — F33.1 MODERATE EPISODE OF RECURRENT MAJOR DEPRESSIVE DISORDER (HCC): ICD-10-CM

## 2021-08-26 DIAGNOSIS — F90.0 ATTENTION DEFICIT HYPERACTIVITY DISORDER (ADHD), PREDOMINANTLY INATTENTIVE TYPE: ICD-10-CM

## 2021-08-26 DIAGNOSIS — F12.10 CANNABIS ABUSE: ICD-10-CM

## 2021-08-26 PROCEDURE — 90833 PSYTX W PT W E/M 30 MIN: CPT | Mod: 95 | Performed by: PSYCHIATRY & NEUROLOGY

## 2021-08-26 PROCEDURE — 99214 OFFICE O/P EST MOD 30 MIN: CPT | Mod: 95 | Performed by: PSYCHIATRY & NEUROLOGY

## 2021-08-26 RX ORDER — TRAZODONE HYDROCHLORIDE 50 MG/1
25 TABLET ORAL NIGHTLY PRN
Qty: 30 TABLET | Refills: 1 | Status: SHIPPED | OUTPATIENT
Start: 2021-08-26 | End: 2021-09-23 | Stop reason: SDUPTHER

## 2021-08-26 RX ORDER — LISDEXAMFETAMINE DIMESYLATE CAPSULES 30 MG/1
30 CAPSULE ORAL EVERY MORNING
Qty: 30 CAPSULE | Refills: 0 | Status: SHIPPED | OUTPATIENT
Start: 2021-08-26 | End: 2021-09-25

## 2021-08-26 NOTE — PROGRESS NOTES
This evaluation was conducted via Zoom using secure and encrypted videoconferencing technology. The patient was in a private location in the [facility].    The patient's identity was confirmed and verbal consent was obtained for this virtual visit.     PSYCHIATRY FOLLOW-UP NOTE      Name: Kulwant Schultz  MRN: [id number]  : 1995  Age: 26 y.o.  Date of assessment: 2021  PCP: LOTTIE Valencia  Persons in attendance: Patient      REASON FOR VISIT/CHIEF COMPLAINT (as stated by Patient):  Kulwant Schultz is a 26 y.o., Unable to Obtain male, attending follow-up appointment for mood and anxiety management.      HISTORY OF PRESENT ILLNESS:  Kulwant Schultz is a 26 y.o. old male with MDD, ADHD and ALICJA comes in today for follow up. Patient was last seen 1 month ago, and following treatment planning recommendations were done:  · Add Zoloft 25 mg daily for 1 week and then increase the dose to 50 mg daily for mood and anxiety management.  5-week supply given with no refill.  · Continue Vyvanse 30 mg daily for ADHD management.  30 tabs given with no refill.  · Patient signed the controlled medication treatment agreement in the clinic today.  · Continue psychotherapy in the clinic for mood and anxiety management.  · Motivated to cut down and stop cannabis use.      Patient is compliant with medication but noticed following side effect after Zoloft was added including mood fluctuation for first few weeks but it has normalized now but denies mood fluctuation to yi or hypomania but describes this as feeling good followed by it down every other day.  Patient also reports disruption of sleep and having diarrhea which is showing signs of improvement.  Patient reports not taking Zoloft after dinner as he feels activated.  Agreed with plan of using trazodone 25 mg to 50 mg for sleep and increasing fiber in his diet before adding any medication for diarrhea.  Patient has not  cut down on cannabis but is planning to begin cutting down in September.    RESPONSE TO TREATMENT:  Slow improvement      MEDICATION SIDE EFFECTS:  Diarrhea and sleep disruption      PSYCHOTHERAPY ASPECT OF SESSION (16 MIN):  • Most part of the session was dedicated to letting patient express his feelings related to the side effects from medication as discussed above.  Validation was provided for appropriate emotional responses and normalization was done.  • Emphasis given on importance of monitoring for triggers that can destabilize the mood including cannabis use and alcohol intake.  • Motivated to cut down and stop the cannabis use.  • Most session was dedicated to active listening and implementing supportive psychotherapy skills.      CURRENT MEDICATIONS:  Current Outpatient Medications   Medication Sig Dispense Refill   • sertraline (ZOLOFT) 50 MG Tab Take 0.5 Tablets by mouth every day for 7 days, THEN 1 tablet every day for 30 days. 34 tablet 0   • Minoxidil (ROGAINE EX) Apply  topically.     • citalopram (CELEXA) 10 MG tablet Take 1 tablet by mouth every day. 30 tablet 11   • lisdexamfetamine (VYVANSE) 30 MG capsule Take 1 capsule by mouth every morning for 30 days. 30 capsule 0   • hydrOXYzine HCl (ATARAX) 25 MG Tab Take 0.5-1 Tabs by mouth 3 times a day as needed for Anxiety. 30 Tab 3   • Adapalene-Benzoyl Peroxide (EPIDUO) 0.1-2.5 % Gel by Apply externally route.     • Minocycline HCl Micronized (AMZEEQ) 4 % Foam by Apply externally route.     • albuterol 108 (90 Base) MCG/ACT Aero Soln inhalation aerosol Inhale 2 Puffs by mouth every four hours as needed for Shortness of Breath. 1 Inhaler 3   • diphenhydrAMINE (BENADRYL) 25 MG Tab Take 25 mg by mouth every 6 hours as needed for Sleep.       No current facility-administered medications for this visit.       MEDICAL HISTORY  Past Medical History:   Diagnosis Date   • ADD (attention deficit disorder)    • Asthma    • Concussion      No past surgical history  on file.    PAST PSYCHIATRIC HISTORY  Prior psychiatric hospitalization: no  Prior Self harm/suicide attempt: no  Prior Diagnosis: ADD     PAST PSYCHIATRIC MEDICATIONS  · Celexa  · Wellbutrin  · Vyvanse  · Adderall  · Hydroxyzine      FAMILY HISTORY  Psychiatric diagnosis:  None diagnosed  History of suicide attempts:  no  Substance abuse history:  no     SUBSTANCE USE HISTORY:  ALCOHOL: occasional  TOBACCO: no  CANNABIS: 5 days/week  OPIOIDS: no  PRESCRIPTION MEDICATIONS: no  OTHERS: no  History of inpatient/outpatient rehab treatment: no     SOCIAL HISTORY  Childhood: born in [place]   Parents  when patient was young close to 2 years  Stayed close to mother while growing up  Education: College graduate  Was an academic probation and school  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  Musculoskeletal: Normal gait.   Abnormal movements: none      MENTAL STATUS EXAMINATION      General:   - Grooming and hygiene: Casual,   - Apparent distress: none,   - Behavior: Calm  - Eye Contact:  Good,   - no psychomotor agitation or retardation    - Participation: Active verbal participation  Orientation: Alert and Fully Oriented to person, place and time  Mood: Depressed and Anxious  Affect: Flexible and Full range,  Thought Process: Logical and Goal-directed  Thought Content: Denies suicidal or homicidal ideations, intent or plan Within normal  limits  Perception: Denies auditory or visual hallucinations. No delusions noted Within normal limits  Attention span and concentration: Intact   Speech:Rate within normal limits and Volume within normal limits  Language: Appropriate   Insight: Good  Judgment: Good  Recent and remote memory: No gross evidence of memory deficits        DEPRESSION SCREENING:  Depression Screen (PHQ-2/PHQ-9) 2/4/2021 6/10/2021 7/23/2021   PHQ-2 Total Score 6 3 2   PHQ-9 Total Score 12 17 12       Interpretation of PHQ-9 Total Score   Score Severity   1-4 No Depression   5-9 Mild Depression   10-14 Moderate Depression   15-19 Moderately Severe Depression   20-27 Severe Depression    CURRENT RISK:       Suicidal: Low       Homicidal: Low       Self-Harm: Low       Relapse: Low       Crisis Safety Plan Reviewed Not Indicated       If evidence of imminent risk is present, intervention/plan:    .  MEDICAL RECORDS/LABS/DIAGNOSTIC TESTS REVIEWED:  No new lab since last visit     [facility] records -   Reviewed       DIAGNOSTIC IMPRESSION(S):  1. Major depressive disorder  2. ADHD  3. Generalized anxiety disorder  4. Cannabis abuse        PLAN:  (1) Major depressive disorder  · Slow improvement  · Continue Zoloft 50 mg daily for mood and anxiety management.  4 weeks supply given with 1 refill.  · Consider switch to Lexapro if diarrhea remains a limiting factor.  · Add trazodone 25-50 mg at bedtime as needed for insomnia.  30 tabs for 50 mg dose with 1 refill given.  · Continue Vyvanse 30 mg daily for ADHD management.  30 tabs given with no refill.  · Patient signed the controlled medication treatment agreement in July 2021.  · Continue psychotherapy in the clinic for mood and anxiety management.  · Motivated to cut down and stop cannabis use.  · Medication options, alternatives (including no medications) and medication risks/benefits/side effects were discussed in detail.  · The patient was advised to call, message provider on [employer], or come in  to the clinic if symptoms worsen or if any future questions/issues regarding their medications arise; the patient verbalized understanding and agreement.    · The patient was educated to call 911, call the suicide hotline, or go to local ER if having thoughts of suicide or homicide; verbalized understanding.     (2) ADHD  · Stable  · Continue Vyvanse 30 mg daily for ADHD management.  30 tabs given with no refill.  · Patient signed the controlled medication treatment agreement in July 2021.  · Continue psychotherapy in the clinic for mood and anxiety management.  · Motivated to cut down and stop cannabis use.     (2) Generalized anxiety disorder  · Slow improvement  · Continue Zoloft 50 mg daily for mood and anxiety management.  4 weeks supply given with 1 refill.  · Consider switch to Lexapro if diarrhea remains a limiting factor.  · Add trazodone 25-50 mg at bedtime as needed for insomnia.  30 tabs for 50 mg dose with 1 refill given.  · Continue Vyvanse 30 mg daily for ADHD management.  30 tabs given with no refill.  · Patient signed the controlled medication treatment agreement in July 2021.  · Continue psychotherapy in the clinic for mood and anxiety management.  · Motivated to cut down and stop cannabis use.     Billing Coding based on:  21927: based on [facility]  03269: based on psychotherapy timing    Return to clinic in 4 weeks or sooner if symptoms worsen.  Next Appointment: instruction provided on how to make the next appointment.     The proposed treatment plan was discussed with the patient who was provided the opportunity to ask questions and make suggestions regarding alternative treatment. Patient verbalized understanding and expressed agreement with the plan.       Amol Hawkins M.D.  08/26/21    This note was created using voice recognition software (Dragon). The accuracy of the dictation is limited by the abilities of the software. I have reviewed the note prior to signing, however some errors in  grammar and context are still possible. If you have any questions related to this note please do not hesitate to contact our office.

## 2021-08-30 ENCOUNTER — DOCUMENTATION (OUTPATIENT)
Dept: BEHAVIORAL HEALTH | Facility: CLINIC | Age: 26
End: 2021-08-30

## 2021-09-08 ENCOUNTER — OFFICE VISIT (OUTPATIENT)
Dept: BEHAVIORAL HEALTH | Facility: CLINIC | Age: 26
End: 2021-09-08
Payer: COMMERCIAL

## 2021-09-08 DIAGNOSIS — F33.1 MODERATE EPISODE OF RECURRENT MAJOR DEPRESSIVE DISORDER (HCC): ICD-10-CM

## 2021-09-08 PROCEDURE — 90837 PSYTX W PT 60 MINUTES: CPT | Performed by: SOCIAL WORKER

## 2021-09-08 NOTE — PROGRESS NOTES
[facility]   Therapy Progress Note        Name: Kulwant Schultz  MRN: [id number]  : 1995  Age: 26 y.o.  Date of assessment: 2021  PCP: LOTTIE Valencia  Persons in attendance: Patient  Total session time: 60 minutes      Topics addressed in psychotherapy include: Pt to in office indiv appt. Pt reports mood improved, with less fluctuation. Pt and writer clinical focus on relationship issues and feeling he did not have proper role modeling as child making it difficult to connect on a dating level. Reviewed parental personality types, messages learned/observed/told as child Pt is anxious and fearful about social settings/girls. Plan: See bi weekly.     Objective Observations:   Participation:Active verbal participation and Engaged   Grooming:Neat   Cognition:Fully Oriented   Eye Contact:Good   Mood:Euthymic   Affect:Congruent with content   Thought Process:Goal-directed   Speech:Rate within normal limits and Volume within normal limits    Current Risk:   Suicide: NA   Homicide: NA   Self-Harm: NA   Relapse: NA   Safety Plan Reviewed: NA    Care Plan Updated: No    Does patient express agreement with the above plan? Yes     Diagnosis:  1. Moderate recurrent depression    Therapeutic Intervention(s): Interpersonal effectiveness skills, Maladaptive behavior addressed and Supportive psychotherapy    Treatment Goal(s)/Objective(s) addressed: Depression/anxiety     Progress toward Treatment Goals: Mild improvement    Referral appointment(s) scheduled? No       Mitra Loya, LCSW, MSW

## 2021-09-22 ENCOUNTER — OFFICE VISIT (OUTPATIENT)
Dept: BEHAVIORAL HEALTH | Facility: CLINIC | Age: 26
End: 2021-09-22
Payer: COMMERCIAL

## 2021-09-22 DIAGNOSIS — F33.1 MODERATE EPISODE OF RECURRENT MAJOR DEPRESSIVE DISORDER (HCC): ICD-10-CM

## 2021-09-22 PROCEDURE — 90837 PSYTX W PT 60 MINUTES: CPT | Performed by: SOCIAL WORKER

## 2021-09-22 NOTE — PROGRESS NOTES
[facility]   Therapy Progress Note        Name: Kulwant Schultz  MRN: [id number]  : 1995  Age: 26 y.o.  Date of assessment: 2021  PCP: LOTTIE Valencia  Persons in attendance: Patient  Total session time: 60 minutes      Topics addressed in psychotherapy include: Pt to indiv in office appt. Motivation improved. Pt build garage set up after being challenged by roommate. Pt under increasingly more stress due to mo medical/living situation, fa and sist looking to pt to step in. Pt considering FMLA for 1m w /o pay to go to [place], pros and cons; some stress at work with job vacancies and high work load. Processed feeling of inadequacy in area of dating/meeting/sustaining relationship. Assisted p with thinking errors as it relates to depression. Encouraged pt to meet with boss and resolve conflict. Plan: See bi weekly     Objective Observations:   Participation:Active verbal participation, Engaged and Open to feedback   Grooming:Neat   Cognition:Fully Oriented   Eye Contact:Good   Mood:Euthymic   Affect:Congruent with content   Thought Process:Logical and Goal-directed   Speech:Rate within normal limits and Volume within normal limits    Current Risk:   Suicide: low   Homicide: NA   Self-Harm: NA   Relapse: NA   Safety Plan Reviewed: NA    Care Plan Updated: No    Does patient express agreement with the above plan? Yes     Diagnosis:  No diagnosis found.    Therapeutic Intervention(s): Communication skills, Conflict resolution skills, Goal-setting, Interpersonal effectiveness skills and Problem-solving    Treatment Goal(s)/Objective(s) addressed: thinking errors, stress reducers.      Progress toward Treatment Goals: Mild improvement    Referral appointment(s) scheduled? No       Mitra Loya, LCSW, MSW

## 2021-09-23 ENCOUNTER — TELEMEDICINE (OUTPATIENT)
Dept: BEHAVIORAL HEALTH | Facility: CLINIC | Age: 26
End: 2021-09-23
Payer: COMMERCIAL

## 2021-09-23 DIAGNOSIS — G47.09 OTHER INSOMNIA: ICD-10-CM

## 2021-09-23 DIAGNOSIS — F33.1 MODERATE EPISODE OF RECURRENT MAJOR DEPRESSIVE DISORDER (HCC): ICD-10-CM

## 2021-09-23 DIAGNOSIS — F90.0 ATTENTION DEFICIT HYPERACTIVITY DISORDER (ADHD), PREDOMINANTLY INATTENTIVE TYPE: ICD-10-CM

## 2021-09-23 DIAGNOSIS — F41.1 GAD (GENERALIZED ANXIETY DISORDER): ICD-10-CM

## 2021-09-23 PROCEDURE — 99214 OFFICE O/P EST MOD 30 MIN: CPT | Mod: 95 | Performed by: PSYCHIATRY & NEUROLOGY

## 2021-09-23 PROCEDURE — 90833 PSYTX W PT W E/M 30 MIN: CPT | Mod: 95 | Performed by: PSYCHIATRY & NEUROLOGY

## 2021-09-23 NOTE — PROGRESS NOTES
This evaluation was conducted via Zoom using secure and encrypted videoconferencing technology. The patient was in a private location in the [facility].    The patient's identity was confirmed and verbal consent was obtained for this virtual visit.     PSYCHIATRY FOLLOW-UP NOTE      Name: Kulwant Schultz  MRN: [id number]  : 1995  Age: 26 y.o.  Date of assessment: 2021  PCP: LOTTIE Valencia  Persons in attendance: Patient      REASON FOR VISIT/CHIEF COMPLAINT (as stated by Patient):  Kulwant Schultz is a 26 y.o., Unable to Obtain male, attending follow-up appointment for mood and anxiety management.      HISTORY OF PRESENT ILLNESS:  Kulwant Schultz is a 26 y.o. old male with MDD, ALICJA and ADHD comes in today for follow up. Patient was last seen 1 month ago, and following treatment planning recommendations were done:  · Continue Zoloft 50 mg daily for mood and anxiety management.  4 weeks supply given with 1 refill.  · Consider switch to Lexapro if diarrhea remains a limiting factor.  · Add trazodone 25-50 mg at bedtime as needed for insomnia.  30 tabs for 50 mg dose with 1 refill given.  · Continue Vyvanse 30 mg daily for ADHD management.  30 tabs given with no refill.  · Patient signed the controlled medication treatment agreement in 2021.  · Continue psychotherapy in the clinic for mood and anxiety management.  · Motivated to cut down and stop cannabis use.    Patient is compliant with Zoloft and is showing slow improvement in mood and anxiety but remain with depression and anxiety symptoms due to social stressors as discussed below and psychotherapy aspect of the session.  Patient reports diarrhea is improving with Zoloft and we discussed various options including not changing medication dosing versus increasing the Zoloft dose versus switching to Lexapro.  Patient feels his recent diarrhea is more stress related and he prefers to increase the dose  of Zoloft to 100 mg dose and will take this medication with yogurt to prevent the diarrhea symptoms and after dinner.  Patient agreed with plan of sending me a message on my chart if worsening of GI side effect is noted and then we will switch the medication to Lexapro at that time.    PSYCHOTHERAPY ASPECT OF SESSION (16 MIN):  • Most part of the session was dedicated to letting patient express his feelings related to social stressors primarily his mother currently in hospital for medical complications and currently on a wait list for kidney transplant.  Validation was provided for appropriate emotional responses.  • Importance of reaching out to people close to him and engaging in psychotherapy was discussed.  • Most session was dedicated to active listening and implementing supportive psychotherapy skills.      Patient is going to [place] today and agreed with sending me in the pharmacy in [place] that he prefers for next 1 month supply.      CURRENT MEDICATIONS:  Current Outpatient Medications   Medication Sig Dispense Refill   • sertraline (ZOLOFT) 50 MG Tab Take 1 Tablet by mouth every day. 30 Tablet 1   • lisdexamfetamine (VYVANSE) 30 MG capsule Take 1 Capsule by mouth every morning for 30 days. 30 Capsule 0   • traZODone (DESYREL) 50 MG Tab Take 0.5 Tablets by mouth at bedtime as needed for Sleep (as needed for sleep). (can increase to 1 tab if needed for sleep_ 30 Tablet 1   • Minoxidil (ROGAINE EX) Apply  topically.     • hydrOXYzine HCl (ATARAX) 25 MG Tab Take 0.5-1 Tabs by mouth 3 times a day as needed for Anxiety. 30 Tab 3   • Adapalene-Benzoyl Peroxide (EPIDUO) 0.1-2.5 % Gel by Apply externally route.     • Minocycline HCl Micronized (AMZEEQ) 4 % Foam by Apply externally route.     • albuterol 108 (90 Base) MCG/ACT Aero Soln inhalation aerosol Inhale 2 Puffs by mouth every four hours as needed for Shortness of Breath. 1 Inhaler 3   • diphenhydrAMINE (BENADRYL) 25 MG Tab Take 25 mg by mouth every 6  hours as needed for Sleep.       No current facility-administered medications for this visit.       MEDICAL HISTORY  Past Medical History:   Diagnosis Date   • ADD (attention deficit disorder)    • Asthma    • Concussion      No past surgical history on file.    PAST PSYCHIATRIC HISTORY  Prior psychiatric hospitalization: no  Prior Self harm/suicide attempt: no  Prior Diagnosis: ADD     PAST PSYCHIATRIC MEDICATIONS  · Celexa  · Wellbutrin  · Vyvanse  · Adderall  · Hydroxyzine      FAMILY HISTORY  Psychiatric diagnosis:  None diagnosed  History of suicide attempts:  no  Substance abuse history:  no     SUBSTANCE USE HISTORY:  ALCOHOL: occasional  TOBACCO: no  CANNABIS: 5 days/week  OPIOIDS: no  PRESCRIPTION MEDICATIONS: no  OTHERS: no  History of inpatient/outpatient rehab treatment: no     SOCIAL HISTORY  Childhood: born in [place]   Parents  when patient was young close to 2 years  Stayed close to mother while growing up  Education: College graduate  Was an academic probation and school  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  Musculoskeletal: Normal gait.   Abnormal movements: none      MENTAL STATUS EXAMINATION      General:   - Grooming and hygiene: Casual,   - Apparent distress: none,   - Behavior: Tense  - Eye Contact:  Good,   - no psychomotor agitation or retardation    - Participation: Active verbal participation  Orientation:  Alert and Fully Oriented to person, place and time  Mood: Depressed and Anxious  Affect: Full range,  Thought Process: Logical and Goal-directed  Thought Content: Denies suicidal or homicidal ideations, intent or plan Within normal limits  Perception: Denies auditory or visual hallucinations. No delusions noted Within normal limits  Attention span and concentration: Intact   Speech:Rate within normal limits and Volume within normal limits  Language: Appropriate   Insight: Good  Judgment: Good  Recent and remote memory: No gross evidence of memory deficits        DEPRESSION SCREENING:  Depression Screen (PHQ-2/PHQ-9) 2/4/2021 6/10/2021 7/23/2021   PHQ-2 Total Score 6 3 2   PHQ-9 Total Score 12 17 12       Interpretation of PHQ-9 Total Score   Score Severity   1-4 No Depression   5-9 Mild Depression   10-14 Moderate Depression   15-19 Moderately Severe Depression   20-27 Severe Depression    CURRENT RISK:       Suicidal: Low       Homicidal: Low       Self-Harm: Low       Relapse: Low       Crisis Safety Plan Reviewed Not Indicated       If evidence of imminent risk is present, intervention/plan:      MEDICAL RECORDS/LABS/DIAGNOSTIC TESTS REVIEWED:  No new lab since last visit     [facility] records -   Reviewed     DIAGNOSTIC IMPRESSION(S):  1. Major depressive disorder  2. ADHD  3. Generalized anxiety disorder  4. Cannabis abuse        PLAN:  (1) Major depressive disorder  · Recent increase in depression and anxiety (increase in social stressors)  · Increase Zoloft 100 mg daily for mood and anxiety management.  4 weeks supply given with 0 refill.  · Consider switch to Lexapro if diarrhea remains a limiting factor.  · Continue trazodone 25-50 mg at bedtime as needed for insomnia.  30 tabs for 50 mg dose with 0 refill given.  · Continue Vyvanse 30 mg daily for ADHD management.  30 tabs given with no refill.  · Patient signed the controlled medication treatment agreement in July 2021.  · Continue psychotherapy in the  clinic for mood and anxiety management.  · Motivated to cut down and stop cannabis use.  · Medication options, alternatives (including no medications) and medication risks/benefits/side effects were discussed in detail.  · The patient was advised to call, message provider on MyChart, or come in to the clinic if symptoms worsen or if any future questions/issues regarding their medications arise; the patient verbalized understanding and agreement.    · The patient was educated to call 911, call the suicide hotline, or go to local ER if having thoughts of suicide or homicide; verbalized understanding.     (2) ADHD  · Stable  · Continue Vyvanse 30 mg daily for ADHD management.  30 tabs given with no refill.  · Patient signed the controlled medication treatment agreement in July 2021.  · Continue psychotherapy in the clinic for mood and anxiety management.  · Motivated to cut down and stop cannabis use.     (3) Generalized anxiety disorder  · Recent increase in depression and anxiety (increase in social stressors)  · Increase Zoloft 100 mg daily for mood and anxiety management.  4 weeks supply given with 0 refill.  · Consider switch to Lexapro if diarrhea remains a limiting factor.  · Continue trazodone 25-50 mg at bedtime as needed for insomnia.  30 tabs for 50 mg dose with 0 refill given.  · Continue Vyvanse 30 mg daily for ADHD management.  30 tabs given with no refill.  · Patient signed the controlled medication treatment agreement in July 2021.  · Continue psychotherapy in the clinic for mood and anxiety management.  · Motivated to cut down and stop cannabis use.     Billing Coding based on:  94723: based on [facility]  30239: based on psychotherapy timing    Return to clinic in 4 weeks or sooner if symptoms worsen.  Next Appointment: instruction provided on how to make the next appointment.     The proposed treatment plan was discussed with the patient who was provided the opportunity to ask questions and make suggestions  regarding alternative treatment. Patient verbalized understanding and expressed agreement with the plan.       Amol Hawkins M.D.  09/23/21    This note was created using voice recognition software (Dragon). The accuracy of the dictation is limited by the abilities of the software. I have reviewed the note prior to signing, however some errors in grammar and context are still possible. If you have any questions related to this note please do not hesitate to contact our office.

## 2021-09-24 RX ORDER — SERTRALINE HYDROCHLORIDE 100 MG/1
100 TABLET, FILM COATED ORAL DAILY
Qty: 30 TABLET | Refills: 0 | Status: SHIPPED | OUTPATIENT
Start: 2021-09-24 | End: 2021-11-30

## 2021-09-24 RX ORDER — LISDEXAMFETAMINE DIMESYLATE CAPSULES 30 MG/1
30 CAPSULE ORAL EVERY MORNING
Qty: 30 CAPSULE | Refills: 0 | Status: SHIPPED | OUTPATIENT
Start: 2021-09-24 | End: 2021-10-24

## 2021-09-24 RX ORDER — TRAZODONE HYDROCHLORIDE 50 MG/1
25 TABLET ORAL NIGHTLY PRN
Qty: 30 TABLET | Refills: 0 | Status: SHIPPED | OUTPATIENT
Start: 2021-09-24 | End: 2021-11-30 | Stop reason: SDUPTHER

## 2021-10-06 ENCOUNTER — OFFICE VISIT (OUTPATIENT)
Dept: BEHAVIORAL HEALTH | Facility: CLINIC | Age: 26
End: 2021-10-06
Payer: COMMERCIAL

## 2021-10-06 DIAGNOSIS — F41.1 GAD (GENERALIZED ANXIETY DISORDER): ICD-10-CM

## 2021-10-06 DIAGNOSIS — F33.1 MODERATE EPISODE OF RECURRENT MAJOR DEPRESSIVE DISORDER (HCC): ICD-10-CM

## 2021-10-06 PROCEDURE — 90837 PSYTX W PT 60 MINUTES: CPT | Performed by: SOCIAL WORKER

## 2021-10-06 NOTE — PROGRESS NOTES
[facility]   Therapy Progress Note        Name: Kulwant Schultz  MRN: [id number]  : 1995  Age: 26 y.o.  Date of assessment: 10/6/2021  PCP: LOTTIE Valencia  Persons in attendance: Patient  Total session time: 60 minutes      Topics addressed in psychotherapy include: Pt to indiv in office appt. Mood improved. Mo had heart attack, prompting unscheduled visit to [place] and stress relating to family issues. Clinical focus on intention in work and personal life, allowing opportunities to meet people and be genuine and authentic, empathy, listening skills, and conversational openings. Pt reported self defeating thoughts to reconcile. Pt agreed to look for opportunities to improve skill building. Plan: See bi weekly.      Objective Observations:   Participation:Active verbal participation and Open to feedback   Grooming:Neat   Cognition:Fully Oriented   Eye Contact:Good   Mood:Euthymic   Affect:Flexible and Congruent with content   Thought Process:Logical   Speech:Rate within normal limits and Volume within normal limits    Current Risk:   Suicide: low   Homicide: NA   Self-Harm: NA   Relapse: moderate   Safety Plan Reviewed: NA    Care Plan Updated: No    Does patient express agreement with the above plan? Yes     Diagnosis:  1. Moderate recurrent MDD  2. ALICJA  Therapeutic Intervention(s): Cognitive modification, Communication skills and Interpersonal effectiveness skills    Treatment Goal(s)/Objective(s) addressed: low self esteem, self actualization     Progress toward Treatment Goals: Mild improvement    Referral appointment(s) scheduled? No       Mitra Loya, LCSW, MSW

## 2021-10-20 ENCOUNTER — OFFICE VISIT (OUTPATIENT)
Dept: BEHAVIORAL HEALTH | Facility: CLINIC | Age: 26
End: 2021-10-20
Payer: COMMERCIAL

## 2021-10-20 DIAGNOSIS — F33.1 MODERATE EPISODE OF RECURRENT MAJOR DEPRESSIVE DISORDER (HCC): ICD-10-CM

## 2021-10-20 PROCEDURE — 90837 PSYTX W PT 60 MINUTES: CPT | Performed by: SOCIAL WORKER

## 2021-10-20 NOTE — PROGRESS NOTES
[facility]   Therapy Progress Note        Name: Kulwant Schultz  MRN: [id number]  : 1995  Age: 26 y.o.  Date of assessment: 10/20/2021  PCP: LOTTIE Valencia  Persons in attendance: Patient  Total session time: 60 minutes      Topics addressed in psychotherapy include: Pt to inid in office appt. Mood upbeat, motivated. Pt does not know if medication has assisted with improving this aspect of life; therapist shared this may be a positive. Pt setting up 2 groups/clubs at work to build morale/motivate others. Some challenges with parents/health/real estate and what we have/do not have in our control. Clinical focus on building empathy and listening jarocho in areas of life and given homework to practice these skills. Plan: See bi weekly      Objective Observations:   Participation:Active verbal participation, Engaged and Open to feedback   Grooming:Casual   Cognition:Fully Oriented   Eye Contact:Good   Mood:Euthymic   Affect:Flexible and Congruent with content   Thought Process:Goal-directed   Speech:Rate within normal limits and Volume within normal limits    Current Risk:   Suicide: low   Homicide: NA   Self-Harm: NA   Relapse: moderate   Safety Plan Reviewed: NA    Care Plan Updated: No    Does patient express agreement with the above plan? Yes     Diagnosis:  1. Moderate recurrent depression     Therapeutic Intervention(s): Cognitive modification, Communication skills, Goal-setting and Interpersonal effectiveness skills    Treatment Goal(s)/Objective(s) addressed: improving depression, outside activity involvement     Progress toward Treatment Goals: Moderate improvement    Referral appointment(s) scheduled? No       Mitra Loya, LCSW, MSW

## 2021-11-17 ENCOUNTER — OFFICE VISIT (OUTPATIENT)
Dept: MEDICAL GROUP | Facility: MEDICAL CENTER | Age: 26
End: 2021-11-17
Payer: COMMERCIAL

## 2021-11-17 VITALS
HEART RATE: 102 BPM | DIASTOLIC BLOOD PRESSURE: 70 MMHG | WEIGHT: 181 LBS | OXYGEN SATURATION: 99 % | HEIGHT: 67 IN | TEMPERATURE: 97.7 F | SYSTOLIC BLOOD PRESSURE: 118 MMHG | BODY MASS INDEX: 28.41 KG/M2

## 2021-11-17 DIAGNOSIS — J45.20 MILD INTERMITTENT ASTHMA WITHOUT COMPLICATION: ICD-10-CM

## 2021-11-17 DIAGNOSIS — Z23 NEED FOR VACCINATION: ICD-10-CM

## 2021-11-17 DIAGNOSIS — L70.0 ACNE VULGARIS: ICD-10-CM

## 2021-11-17 PROCEDURE — 90686 IIV4 VACC NO PRSV 0.5 ML IM: CPT | Performed by: NURSE PRACTITIONER

## 2021-11-17 PROCEDURE — 90471 IMMUNIZATION ADMIN: CPT | Performed by: NURSE PRACTITIONER

## 2021-11-17 PROCEDURE — 90472 IMMUNIZATION ADMIN EACH ADD: CPT | Performed by: NURSE PRACTITIONER

## 2021-11-17 PROCEDURE — 99214 OFFICE O/P EST MOD 30 MIN: CPT | Mod: 25 | Performed by: NURSE PRACTITIONER

## 2021-11-17 PROCEDURE — 90651 9VHPV VACCINE 2/3 DOSE IM: CPT | Performed by: NURSE PRACTITIONER

## 2021-11-17 RX ORDER — ALBUTEROL SULFATE 90 UG/1
2 AEROSOL, METERED RESPIRATORY (INHALATION) EVERY 4 HOURS PRN
Qty: 1 EACH | Refills: 4 | Status: SHIPPED | OUTPATIENT
Start: 2021-11-17

## 2021-11-17 RX ORDER — ADAPALENE AND BENZOYL PEROXIDE GEL, 0.1%/2.5% 1; 25 MG/G; MG/G
GEL TOPICAL
Qty: 45 G | Refills: 3 | Status: SHIPPED | OUTPATIENT
Start: 2021-11-17

## 2021-11-17 NOTE — PROGRESS NOTES
Subjective:   Kulwant Schultz is a 26 y.o. male here today for refills    Mild intermittent asthma without complication  Chronic. Diagnosed in childhood. Severity was worse as a child, they have lessoned to more exercise induced asthma. Has been on Advair for this, not taking daily, usually only takes this when there are fires or other irritants that flare his symptoms. Otherwise only using albuterol 5-6 days per week prior to exercise.      No night time cough, shortness of breath.      Requesting refills of albuterol.     Acne vulgaris  Chronic issue. Currently using Epiduo gel which has been working well as well as using a humidifier.     Was seeing dermatology but feels his acne is well controlled on current regimen. Would like refills of Epiduo.            Current medicines (including changes today)  Current Outpatient Medications   Medication Sig Dispense Refill   • albuterol 108 (90 Base) MCG/ACT Aero Soln inhalation aerosol Inhale 2 Puffs every four hours as needed for Shortness of Breath. 1 Each 4   • Adapalene-Benzoyl Peroxide (EPIDUO) 0.1-2.5 % Gel Apply to acne prone areas nightly. 45 g 3   • traZODone (DESYREL) 50 MG Tab Take 0.5 Tablets by mouth at bedtime as needed for Sleep (as needed for sleep). (can increase to 1 tab if needed for sleep_ 30 Tablet 0   • sertraline (ZOLOFT) 100 MG Tab Take 1 Tablet by mouth every day. 30 Tablet 0   • Minoxidil (ROGAINE EX) Apply  topically.     • hydrOXYzine HCl (ATARAX) 25 MG Tab Take 0.5-1 Tabs by mouth 3 times a day as needed for Anxiety. 30 Tab 3   • Minocycline HCl Micronized (AMZEEQ) 4 % Foam by Apply externally route.     • diphenhydrAMINE (BENADRYL) 25 MG Tab Take 25 mg by mouth every 6 hours as needed for Sleep.       No current facility-administered medications for this visit.     He  has a past medical history of ADD (attention deficit disorder), Asthma, and Concussion.    ROS   No chest pain, no shortness of breath, no abdominal  "pain  Positive ROS as per HPI.  All other systems reviewed and are negative.     Objective:     /70 (BP Location: Right arm, Patient Position: Sitting, BP Cuff Size: Adult)   Pulse (!) 102   Temp 36.5 °C (97.7 °F) (Temporal)   Ht 1.702 m (5' 7\")   Wt 82.1 kg (181 lb)   SpO2 99%  Body mass index is 28.35 kg/m².     Physical Exam:  Constitutional: Alert, no distress.  Skin: Warm, dry, good turgor, no rashes in visible areas.  Eye: Equal, round and reactive, conjunctiva clear, lids normal.  ENMT: Mask in place  Respiratory: Unlabored respiratory effort  Psych: Alert and oriented x3, normal affect and mood.      Assessment and Plan:   The following treatment plan was discussed    1. Mild intermittent asthma without complication  Stable  Continue albuterol as needed  - albuterol 108 (90 Base) MCG/ACT Aero Soln inhalation aerosol; Inhale 2 Puffs every four hours as needed for Shortness of Breath.  Dispense: 1 Each; Refill: 4    2. Acne vulgaris  Stable  Continue epiduo gel, refilled  - Adapalene-Benzoyl Peroxide (EPIDUO) 0.1-2.5 % Gel; Apply to acne prone areas nightly.  Dispense: 45 g; Refill: 3    3. Need for vaccination  - 9VHPV Vaccine 2-3 Dose IM  - INFLUENZA VACCINE QUAD INJ (PF)      Followup: Return if symptoms worsen or fail to improve.    I have placed the below orders and discussed them with an approved delegating provider. The MA is performing the below orders under the direction of Dr. Briggs           "

## 2021-11-17 NOTE — ASSESSMENT & PLAN NOTE
Chronic issue. Currently using Epiduo gel which has been working well as well as using a humidifier.     Was seeing dermatology but feels his acne is well controlled on current regimen. Would like refills of Epiduo.

## 2021-11-17 NOTE — ASSESSMENT & PLAN NOTE
Chronic. Diagnosed in childhood. Severity was worse as a child, they have lessoned to more exercise induced asthma. Has been on Advair for this, not taking daily, usually only takes this when there are fires or other irritants that flare his symptoms. Otherwise only using albuterol 5-6 days per week prior to exercise.      No night time cough, shortness of breath.      Requesting refills of albuterol.

## 2021-11-30 DIAGNOSIS — F98.8 ATTENTION DEFICIT DISORDER (ADD) WITHOUT HYPERACTIVITY: ICD-10-CM

## 2021-11-30 DIAGNOSIS — G47.09 OTHER INSOMNIA: ICD-10-CM

## 2021-11-30 RX ORDER — LISDEXAMFETAMINE DIMESYLATE 30 MG/1
30 CAPSULE ORAL EVERY MORNING
Qty: 30 CAPSULE | Refills: 0 | OUTPATIENT
Start: 2021-11-30 | End: 2021-12-30

## 2021-11-30 RX ORDER — TRAZODONE HYDROCHLORIDE 50 MG/1
25 TABLET ORAL NIGHTLY PRN
Qty: 30 TABLET | Refills: 2 | Status: SHIPPED | OUTPATIENT
Start: 2021-11-30 | End: 2022-01-14 | Stop reason: SDUPTHER

## 2021-11-30 RX ORDER — TRAZODONE HYDROCHLORIDE 50 MG/1
25 TABLET ORAL NIGHTLY PRN
Qty: 30 TABLET | Refills: 0 | Status: CANCELLED | OUTPATIENT
Start: 2021-11-30

## 2021-11-30 RX ORDER — LISDEXAMFETAMINE DIMESYLATE CAPSULES 30 MG/1
30 CAPSULE ORAL EVERY MORNING
Qty: 30 CAPSULE | Refills: 0 | Status: SHIPPED | OUTPATIENT
Start: 2021-11-30 | End: 2021-12-30

## 2021-12-16 ENCOUNTER — TELEMEDICINE (OUTPATIENT)
Dept: BEHAVIORAL HEALTH | Facility: CLINIC | Age: 26
End: 2021-12-16
Payer: COMMERCIAL

## 2021-12-16 DIAGNOSIS — F33.1 MODERATE EPISODE OF RECURRENT MAJOR DEPRESSIVE DISORDER (HCC): ICD-10-CM

## 2021-12-16 DIAGNOSIS — F41.1 GAD (GENERALIZED ANXIETY DISORDER): ICD-10-CM

## 2021-12-16 PROCEDURE — 90834 PSYTX W PT 45 MINUTES: CPT | Mod: 95 | Performed by: SOCIAL WORKER

## 2021-12-16 NOTE — PROGRESS NOTES
"[facility]   Therapy Progress Note    This visit was conducted via Zoom using secure and encrypted videoconferencing technology.  The patient was in a private location in the [facility].  The patient's identity was confirmed and verbal consent was obtained for this virtual visit.    Name: Kulwant Schultz  MRN: [id number]  : 1995  Age: 26 y.o.  Date of assessment: 2021  PCP: LOTTIE Valencia.  Persons in attendance: Patient  Total session time: 50 minutes    Topics addressed in psychotherapy include: Pt to indiv video appt. Pt off anti depressant, follow through taking med poor and pt feels he can manage without although does report self medicating with weed; quit job for \"emotional well being,\" stress relating to family, apts, mother  last week after recent heart attack and body shutting down.  Processed grief, decision making, short long term goals, planning activities. Pt may be minimizing depressive/anxiety symptoms. Continue to monitor mood. Plan: see biweekly.      Objective Observations:   Participation:Active verbal participation, Engaged and Open to feedback   Grooming:Casual   Cognition:Fully Oriented   Eye Contact:Good   Mood:Depressed   Affect:Congruent with content   Thought Process:Goal-directed   Speech:Rate within normal limits and Volume within normal limits    Current Risk:   Suicide: low   Homicide: NA   Self-Harm: NA   Relapse: NA   Safety Plan Reviewed: NA    Care Plan Updated: No    Does patient express agreement with the above plan? Yes     Diagnosis:  1. Moderate recurrent major depression disorder    Therapeutic Intervention(s): Distress tolerance skills, Stressors assessed and Supportive psychotherapy    Treatment Goal(s)/Objective(s) addressed: grief/loss/mood     Progress toward Treatment Goals: Mild decline    Referral appointment(s) scheduled? Yes, [place] behavior [facility]       Mitra Loya L.C.S.W.    "

## 2022-01-12 ENCOUNTER — TELEMEDICINE (OUTPATIENT)
Dept: BEHAVIORAL HEALTH | Facility: CLINIC | Age: 27
End: 2022-01-12
Payer: COMMERCIAL

## 2022-01-12 DIAGNOSIS — F33.1 MODERATE EPISODE OF RECURRENT MAJOR DEPRESSIVE DISORDER (HCC): ICD-10-CM

## 2022-01-12 DIAGNOSIS — F41.1 GAD (GENERALIZED ANXIETY DISORDER): ICD-10-CM

## 2022-01-12 PROCEDURE — 90834 PSYTX W PT 45 MINUTES: CPT | Mod: 95 | Performed by: SOCIAL WORKER

## 2022-01-12 NOTE — PROGRESS NOTES
[facility]   Therapy Progress Note    This visit was conducted via Zoom using secure and encrypted videoconferencing technology.  The patient was in a private location in the [facility].  The patient's identity was confirmed and verbal consent was obtained for this virtual visit.    Name: Kulwant Schultz  MRN: [id number]  : 1995  Age: 26 y.o.  Date of assessment: 2022  PCP: LOTTIE Valencia.  Persons in attendance: Patient  Total session time: 50 minutes    Topics addressed in psychotherapy include: Pt to [employer] video appt. Mood stable, some tearfulness when discussing mom. Pt and writer processed grief/loss of mother and being responsible for finances, helping father, properties. Pt not working, some days playing video games and smoking marijuana only. Explored moderation as tool for mood. Pt hard on self/feeling need to be productive vs self gratification/child. Encouraged finding provider in  in [place]. Plan: See bi weekly until move to [place].   Objective Observations:   Participation:Active verbal participation and Engaged   Grooming:Casual   Cognition:Fully Oriented   Eye Contact:Good   Mood:Euthymic   Affect:Flexible   Thought Process:Goal-directed   Speech:Rate within normal limits and Volume within normal limits    Current Risk:   Suicide: low   Homicide: NA   Self-Harm: NA   Relapse: NA   Safety Plan Reviewed: NA    Care Plan Updated: No    Does patient express agreement with the above plan? Yes     Diagnosis:  1. ALICJA    Therapeutic Intervention(s): Cognitive modification, Goal-setting and Psychoeducation RE: substance usage    Treatment Goal(s)/Objective(s) addressed:  Grief and loss    Progress toward Treatment Goals: Mild improvement    Referral appointment(s) scheduled? Yes, mental health in [place]       Mitra Loya L.C.S.W.

## 2022-01-14 ENCOUNTER — TELEMEDICINE (OUTPATIENT)
Dept: BEHAVIORAL HEALTH | Facility: CLINIC | Age: 27
End: 2022-01-14
Payer: COMMERCIAL

## 2022-01-14 DIAGNOSIS — F33.1 MODERATE EPISODE OF RECURRENT MAJOR DEPRESSIVE DISORDER (HCC): ICD-10-CM

## 2022-01-14 DIAGNOSIS — F90.0 ATTENTION DEFICIT HYPERACTIVITY DISORDER (ADHD), PREDOMINANTLY INATTENTIVE TYPE: ICD-10-CM

## 2022-01-14 DIAGNOSIS — G47.09 OTHER INSOMNIA: ICD-10-CM

## 2022-01-14 DIAGNOSIS — F41.1 GAD (GENERALIZED ANXIETY DISORDER): ICD-10-CM

## 2022-01-14 PROCEDURE — 99214 OFFICE O/P EST MOD 30 MIN: CPT | Mod: 95 | Performed by: PSYCHIATRY & NEUROLOGY

## 2022-01-14 RX ORDER — LISDEXAMFETAMINE DIMESYLATE CAPSULES 30 MG/1
30 CAPSULE ORAL EVERY MORNING
Qty: 30 CAPSULE | Refills: 0 | Status: SHIPPED | OUTPATIENT
Start: 2022-01-14 | End: 2022-02-13

## 2022-01-14 RX ORDER — LISDEXAMFETAMINE DIMESYLATE CAPSULES 30 MG/1
30 CAPSULE ORAL EVERY MORNING
Qty: 30 CAPSULE | Refills: 0 | Status: SHIPPED | OUTPATIENT
Start: 2022-02-14 | End: 2022-03-16

## 2022-01-14 RX ORDER — TRAZODONE HYDROCHLORIDE 50 MG/1
50 TABLET ORAL NIGHTLY PRN
Qty: 90 TABLET | Refills: 2 | Status: SHIPPED | OUTPATIENT
Start: 2022-01-14

## 2022-01-14 RX ORDER — LISDEXAMFETAMINE DIMESYLATE CAPSULES 30 MG/1
30 CAPSULE ORAL EVERY MORNING
Qty: 30 CAPSULE | Refills: 0 | Status: SHIPPED | OUTPATIENT
Start: 2022-03-17 | End: 2022-04-16

## 2022-02-09 ENCOUNTER — OFFICE VISIT (OUTPATIENT)
Dept: BEHAVIORAL HEALTH | Facility: CLINIC | Age: 27
End: 2022-02-09
Payer: COMMERCIAL

## 2022-02-09 DIAGNOSIS — F33.1 MODERATE EPISODE OF RECURRENT MAJOR DEPRESSIVE DISORDER (HCC): ICD-10-CM

## 2022-02-09 PROCEDURE — 90837 PSYTX W PT 60 MINUTES: CPT | Performed by: SOCIAL WORKER

## 2022-02-09 NOTE — PROGRESS NOTES
[facility]   Therapy Progress Note        Name: Kulwant Schultz  MRN: [id number]  : 1995  Age: 26 y.o.  Date of assessment: 2022  PCP: LOTTIE Valencia  Persons in attendance: Patient  Total session time: 55 minutes      Topics addressed in psychotherapy include: Pt to in office indiv appt. Pt and writer address previous relationship in connection with communication/emotional restriction/styles in family. Pt has opportunity to revisit some of mothers journals while  to father. Clinical focus on perspective taking/empathy for others in [place] of being self focused. Provided pt with info on book, Insight to look at self and others view of self. Pt has some difficulty with taking ownership/responsiblity in relationship and would like to move forward. Pt will ask father about parents relationship. Reviewed child vs adult behaviors. Plan: See monthly.     Objective Observations:   Participation:Active verbal participation and Engaged   Grooming:Neat   Cognition:Fully Oriented   Eye Contact:Good   Mood:Euthymic   Affect:Constricted   Thought Process:Goal-directed   Speech:Rate within normal limits and Volume within normal limits    Current Risk:   Suicide: low   Homicide: NA   Self-Harm: NA   Relapse: NA   Safety Plan Reviewed: NA    Care Plan Updated: No    Does patient express agreement with the above plan? Yes     Diagnosis:  1. Mod Recurrent MDD    Therapeutic Intervention(s): Communication skills, Interpersonal effectiveness skills and Supportive psychotherapy    Treatment Goal(s)/Objective(s) addressed: grief/loss emotions     Progress toward Treatment Goals: Moderate improvement    Referral appointment(s) scheduled? No       Mitra Loya, LCSW, MSW

## 2022-03-08 ENCOUNTER — TELEMEDICINE (OUTPATIENT)
Dept: BEHAVIORAL HEALTH | Facility: CLINIC | Age: 27
End: 2022-03-08
Payer: COMMERCIAL

## 2022-03-08 DIAGNOSIS — F33.1 MODERATE EPISODE OF RECURRENT MAJOR DEPRESSIVE DISORDER (HCC): ICD-10-CM

## 2022-03-08 DIAGNOSIS — F41.1 GAD (GENERALIZED ANXIETY DISORDER): ICD-10-CM

## 2022-03-08 PROCEDURE — 90837 PSYTX W PT 60 MINUTES: CPT | Mod: GT | Performed by: SOCIAL WORKER

## 2022-03-08 NOTE — PROGRESS NOTES
[facility]   Therapy Progress Note    This visit was conducted via Zoom using secure and encrypted videoconferencing technology.  The patient was in his home in the [facility].  The patient's identity was confirmed and verbal consent was obtained for this virtual visit.    Name: Kulwant Schultz  MRN: [id number]  : 1995  Age: 26 y.o.  Date of assessment: 3/8/2022  PCP: LOTTIE Valencia.  Persons in attendance: Patient  Total session time: 54 minutes    Topics addressed in psychotherapy include: Pt to indiv video appt. Studying for [employer], testing end of April. Clinical focus on reframing thoughts about failure and being nicer to self. Explored/identify the pressure in thoughts. Reading the book, Insight. Plan: See monthly.     Objective Observations:   Participation:Active verbal participation, Engaged and Open to feedback   Grooming:Casual   Cognition:Fully Oriented   Eye Contact:Good   Mood:Euthymic   Affect:Flexible and Congruent with content   Thought Process:Goal-directed   Speech:Rate within normal limits and Volume within normal limits    Current Risk:   Suicide: low   Homicide: NA   Self-Harm: NA   Relapse: NA   Safety Plan Reviewed: no    Care Plan Updated: No    Does patient express agreement with the above plan? Yes     Diagnosis:  1. Mod. Recurrent MDD    Therapeutic Intervention(s): Cognitive modification, Conflict resolution skills, Goal-setting and Interpersonal effectiveness skills    Treatment Goal(s)/Objective(s) addressed: depression     Progress toward Treatment Goals: Moderate improvement    Referral appointment(s) scheduled? No       Mitra Loya L.C.S.W.

## 2023-11-08 NOTE — ASSESSMENT & PLAN NOTE
Chronic.  Diagnosed end of high school. Was established with psychiatry in [place], taking Vyvanse 30 mg once daily, takes medication vacations regularly. No weight loss, heart palpitation, chest pain. Does report insomnia but this was an issue prior to starting Vyvanse.      His last dose of Vyvanse was yesterday.  He does report to using marijuana occasionally, around once per week.  No other illicit drugs.    Does take medication vacations regularly. Does notice about a 20 point increase in his blood pressure when on the medication, generally runs 100s systolic off medication, increases to 120-130s on medication, no higher.     Requesting refills today.    Admission

## 2024-02-22 NOTE — ASSESSMENT & PLAN NOTE
Chronic. New to me today. Diagnosed end of high school. Was established with psychiatry in [place], taking Vyvanse 30 mg once daily, takes medication vacations regularly. No weight loss, heart palpitation, chest pain. Does report insomnia but this was an issue prior to starting Vyvanse.     Requesting medication refill until he is able to establish with a local psychiatrist.  His last dose of Vyvanse was yesterday.  He does report to using marijuana occasionally, around once per week.  No other illicit drugs.   4 = No assist / stand by assistance